# Patient Record
Sex: MALE | Race: WHITE | NOT HISPANIC OR LATINO | Employment: OTHER | URBAN - METROPOLITAN AREA
[De-identification: names, ages, dates, MRNs, and addresses within clinical notes are randomized per-mention and may not be internally consistent; named-entity substitution may affect disease eponyms.]

---

## 2020-09-10 ENCOUNTER — HOSPITAL ENCOUNTER (OUTPATIENT)
Dept: CARDIOLOGY | Facility: HOSPITAL | Age: 70
Discharge: HOME | End: 2020-09-10
Attending: INTERNAL MEDICINE

## 2020-10-06 ENCOUNTER — HOSPITAL ENCOUNTER (OUTPATIENT)
Dept: OTHER | Facility: HOSPITAL | Age: 70
Discharge: HOME | End: 2020-10-06
Attending: INTERNAL MEDICINE

## 2022-02-18 ENCOUNTER — OFFICE VISIT (OUTPATIENT)
Dept: FAMILY MEDICINE CLINIC | Age: 72
End: 2022-02-18

## 2022-02-18 ENCOUNTER — LAB (OUTPATIENT)
Dept: LAB | Facility: HOSPITAL | Age: 72
End: 2022-02-18

## 2022-02-18 VITALS
BODY MASS INDEX: 27.58 KG/M2 | HEIGHT: 68 IN | OXYGEN SATURATION: 93 % | WEIGHT: 182 LBS | SYSTOLIC BLOOD PRESSURE: 133 MMHG | DIASTOLIC BLOOD PRESSURE: 83 MMHG | TEMPERATURE: 98.1 F | HEART RATE: 72 BPM

## 2022-02-18 DIAGNOSIS — Z00.00 WELLNESS EXAMINATION: ICD-10-CM

## 2022-02-18 DIAGNOSIS — F41.9 ANXIETY: ICD-10-CM

## 2022-02-18 DIAGNOSIS — E03.9 HYPOTHYROIDISM, UNSPECIFIED TYPE: ICD-10-CM

## 2022-02-18 DIAGNOSIS — Z00.00 WELLNESS EXAMINATION: Primary | ICD-10-CM

## 2022-02-18 DIAGNOSIS — Z11.59 SCREENING FOR VIRAL DISEASE: ICD-10-CM

## 2022-02-18 DIAGNOSIS — E78.01 FAMILIAL HYPERCHOLESTEROLEMIA: ICD-10-CM

## 2022-02-18 DIAGNOSIS — H65.93 FLUID LEVEL BEHIND TYMPANIC MEMBRANE OF BOTH EARS: ICD-10-CM

## 2022-02-18 DIAGNOSIS — I10 PRIMARY HYPERTENSION: ICD-10-CM

## 2022-02-18 DIAGNOSIS — R07.9 CHEST PAIN, UNSPECIFIED TYPE: ICD-10-CM

## 2022-02-18 LAB
ALBUMIN SERPL-MCNC: 4.9 G/DL (ref 3.5–5.2)
ALBUMIN/GLOB SERPL: 1.6 G/DL
ALP SERPL-CCNC: 71 U/L (ref 39–117)
ALT SERPL W P-5'-P-CCNC: 23 U/L (ref 1–41)
ANION GAP SERPL CALCULATED.3IONS-SCNC: 9 MMOL/L (ref 5–15)
AST SERPL-CCNC: 27 U/L (ref 1–40)
BILIRUB SERPL-MCNC: 0.4 MG/DL (ref 0–1.2)
BUN SERPL-MCNC: 13 MG/DL (ref 8–23)
BUN/CREAT SERPL: 15.3 (ref 7–25)
CALCIUM SPEC-SCNC: 9.9 MG/DL (ref 8.6–10.5)
CHLORIDE SERPL-SCNC: 100 MMOL/L (ref 98–107)
CHOLEST SERPL-MCNC: 179 MG/DL (ref 0–200)
CO2 SERPL-SCNC: 29 MMOL/L (ref 22–29)
CREAT SERPL-MCNC: 0.85 MG/DL (ref 0.76–1.27)
DEPRECATED RDW RBC AUTO: 48.1 FL (ref 37–54)
ERYTHROCYTE [DISTWIDTH] IN BLOOD BY AUTOMATED COUNT: 13.5 % (ref 12.3–15.4)
GFR SERPL CREATININE-BSD FRML MDRD: 89 ML/MIN/1.73
GLOBULIN UR ELPH-MCNC: 3 GM/DL
GLUCOSE SERPL-MCNC: 86 MG/DL (ref 65–99)
HCT VFR BLD AUTO: 44.3 % (ref 37.5–51)
HDLC SERPL-MCNC: 43 MG/DL (ref 40–60)
HGB BLD-MCNC: 14.6 G/DL (ref 13–17.7)
LDLC SERPL CALC-MCNC: 110 MG/DL (ref 0–100)
LDLC/HDLC SERPL: 2.49 {RATIO}
MCH RBC QN AUTO: 31.7 PG (ref 26.6–33)
MCHC RBC AUTO-ENTMCNC: 33 G/DL (ref 31.5–35.7)
MCV RBC AUTO: 96.1 FL (ref 79–97)
PLATELET # BLD AUTO: 291 10*3/MM3 (ref 140–450)
PMV BLD AUTO: 10.3 FL (ref 6–12)
POTASSIUM SERPL-SCNC: 4.2 MMOL/L (ref 3.5–5.2)
PROT SERPL-MCNC: 7.9 G/DL (ref 6–8.5)
RBC # BLD AUTO: 4.61 10*6/MM3 (ref 4.14–5.8)
SODIUM SERPL-SCNC: 138 MMOL/L (ref 136–145)
TRIGL SERPL-MCNC: 145 MG/DL (ref 0–150)
TSH SERPL DL<=0.05 MIU/L-ACNC: 1.21 UIU/ML (ref 0.27–4.2)
VLDLC SERPL-MCNC: 26 MG/DL (ref 5–40)
WBC NRBC COR # BLD: 6.09 10*3/MM3 (ref 3.4–10.8)

## 2022-02-18 PROCEDURE — 80053 COMPREHEN METABOLIC PANEL: CPT

## 2022-02-18 PROCEDURE — G0439 PPPS, SUBSEQ VISIT: HCPCS | Performed by: NURSE PRACTITIONER

## 2022-02-18 PROCEDURE — 86803 HEPATITIS C AB TEST: CPT

## 2022-02-18 PROCEDURE — 84443 ASSAY THYROID STIM HORMONE: CPT

## 2022-02-18 PROCEDURE — 99213 OFFICE O/P EST LOW 20 MIN: CPT | Performed by: NURSE PRACTITIONER

## 2022-02-18 PROCEDURE — 36415 COLL VENOUS BLD VENIPUNCTURE: CPT

## 2022-02-18 PROCEDURE — 80061 LIPID PANEL: CPT

## 2022-02-18 PROCEDURE — 1159F MED LIST DOCD IN RCRD: CPT | Performed by: NURSE PRACTITIONER

## 2022-02-18 PROCEDURE — 85027 COMPLETE CBC AUTOMATED: CPT

## 2022-02-18 RX ORDER — DIGOXIN 125 MCG
125 TABLET ORAL DAILY
COMMUNITY
End: 2023-02-09

## 2022-02-18 RX ORDER — TAMSULOSIN HYDROCHLORIDE 0.4 MG/1
1 CAPSULE ORAL DAILY
COMMUNITY
End: 2022-02-18

## 2022-02-18 RX ORDER — FLUTICASONE PROPIONATE 50 MCG
2 SPRAY, SUSPENSION (ML) NASAL DAILY
Qty: 11.1 ML | Refills: 0 | Status: SHIPPED | OUTPATIENT
Start: 2022-02-18

## 2022-02-18 RX ORDER — NITROGLYCERIN 0.4 MG/1
0.4 TABLET SUBLINGUAL
Qty: 100 TABLET | Refills: 0 | Status: SHIPPED | OUTPATIENT
Start: 2022-02-18

## 2022-02-18 RX ORDER — BUSPIRONE HYDROCHLORIDE 10 MG/1
10 TABLET ORAL 2 TIMES DAILY
Qty: 60 TABLET | Refills: 1 | Status: SHIPPED | OUTPATIENT
Start: 2022-02-18 | End: 2023-02-09

## 2022-02-18 RX ORDER — LISINOPRIL AND HYDROCHLOROTHIAZIDE 20; 12.5 MG/1; MG/1
1 TABLET ORAL DAILY
COMMUNITY
Start: 2021-12-27 | End: 2022-02-18

## 2022-02-18 RX ORDER — METOPROLOL TARTRATE 50 MG/1
50 TABLET, FILM COATED ORAL 2 TIMES DAILY
COMMUNITY
End: 2023-02-09

## 2022-02-18 RX ORDER — AMLODIPINE BESYLATE 5 MG/1
5 TABLET ORAL DAILY
COMMUNITY
End: 2023-02-09

## 2022-02-18 RX ORDER — LISINOPRIL AND HYDROCHLOROTHIAZIDE 20; 12.5 MG/1; MG/1
1 TABLET ORAL DAILY
COMMUNITY
End: 2023-03-24 | Stop reason: SDUPTHER

## 2022-02-18 RX ORDER — NITROGLYCERIN 80 MG/1
1 PATCH TRANSDERMAL DAILY
COMMUNITY
End: 2022-02-18

## 2022-02-18 RX ORDER — NITROGLYCERIN 0.4 MG/1
0.4 TABLET SUBLINGUAL
COMMUNITY
End: 2022-02-18

## 2022-02-18 RX ORDER — PRAVASTATIN SODIUM 20 MG
20 TABLET ORAL DAILY
COMMUNITY
End: 2022-02-21 | Stop reason: SDUPTHER

## 2022-02-18 RX ORDER — ALLOPURINOL 100 MG/1
100 TABLET ORAL DAILY
COMMUNITY
End: 2022-02-18 | Stop reason: SDUPTHER

## 2022-02-18 RX ORDER — AZELASTINE HYDROCHLORIDE, FLUTICASONE PROPIONATE 137; 50 UG/1; UG/1
SPRAY, METERED NASAL
COMMUNITY
End: 2022-02-18

## 2022-02-18 RX ORDER — ALLOPURINOL 100 MG/1
100 TABLET ORAL DAILY
COMMUNITY
Start: 2021-12-04 | End: 2022-02-18

## 2022-02-18 RX ORDER — LEVOTHYROXINE SODIUM 112 UG/1
112 TABLET ORAL DAILY
COMMUNITY
End: 2023-02-09

## 2022-02-18 NOTE — PROGRESS NOTES
Subsequent Medicare Wellness Visit  The ABC's of Medicare Wellness Visit  Chief Complaint   Patient presents with   • Establish Care     new pt       Subjective   History of Present Illness      Telly Meneses is a 71 y.o. male who presents   for a Subsequent Medicare Wellness Visit.    Does the patient have evidence of cognitive impairment?   No    Asprin use counseling:Contraindicated from taking ASA    Recent Hospitalizations:  No hospitalization(s) within the last year.    Advanced Care Planning:  ACP discussion was held with the patient during this visit. Patient does not have an advance directive, information provided.    The following portions of the patient's history were reviewed   and updated as appropriate: allergies, current medications, past family history, past medical history, past social history, past surgical history and problem list.    Compared to one year ago, the patient feels his   physical health is the same.  Compared to one year ago, the patient feels his   mental health is worse.    Reviewed chart for potential of high risk medication in the elderly:  no  Reviewed chart for potential of harmful drug interactions in the elderly:  no    Patient's Body mass index is 27.67 kg/m². indicating that he is overweight (BMI 25-29.9). Patient's (Body mass index is 27.67 kg/m².) indicates that they are overweight with health conditions that include obstructive sleep apnea, hypertension, coronary heart disease, dyslipidemias and osteoarthritis . Weight is unchanged. BMI is is above average; BMI management plan is completed. We discussed portion control and increasing exercise. .       HEALTH RISK ASSESSMENT BEGIN  Fall Risk Screen:  BREANNA Fall Risk Assessment was completed, and patient is at HIGH risk for falls. Assessment completed on:2/18/2022    Depression Screen:   PHQ-2/PHQ-9 Depression Screening 2/18/2022   Little interest or pleasure in doing things 0   Feeling down, depressed, or hopeless 0    Total Score 0       Current Medical Providers:  Patient Care Team:  Sayda Jones APRN as PCP - General (Nurse Practitioner)    Smoking Status:  Social History     Tobacco Use   Smoking Status Current Every Day Smoker   • Types: Cigarettes   Smokeless Tobacco Never Used       Alcohol Consumption:  Social History     Substance and Sexual Activity   Alcohol Use Yes    Comment: occ       Health Habits and Functional and Cognitive Screening:  No flowsheet data found.    Age-appropriate Screening Schedule:  Refer to the list below for future screening recommendations based on patient's age, sex and/or medical conditions. Orders for these recommended tests are listed in the plan section. The patient has been provided with a written plan.    Health Maintenance   Topic Date Due   • ZOSTER VACCINE (1 of 2) Never done   • LIPID PANEL  02/18/2023   • TDAP/TD VACCINES (2 - Td or Tdap) 02/25/2026   • INFLUENZA VACCINE  Completed     HEALTH RISK ASSESSMENT END    Outpatient Medications Prior to Visit   Medication Sig Dispense Refill   • lisinopril-hydrochlorothiazide (PRINZIDE,ZESTORETIC) 20-12.5 MG per tablet Take 1 tablet by mouth Daily.     • allopurinol (ZYLOPRIM) 100 MG tablet Take 100 mg by mouth Daily.     • nitroglycerin (NITRODUR) 0.4 MG/HR patch Place 1 patch on the skin as directed by provider Daily.     • nitroglycerin (NITROSTAT) 0.4 MG SL tablet Place 0.4 mg under the tongue Every 5 (Five) Minutes As Needed for Chest Pain. Take no more than 3 doses in 15 minutes.     • pravastatin (PRAVACHOL) 20 MG tablet Take 20 mg by mouth Daily.     • amLODIPine (NORVASC) 5 MG tablet Take 5 mg by mouth Daily.     • digoxin (LANOXIN) 125 MCG tablet Take 125 mcg by mouth Daily.     • levothyroxine (SYNTHROID, LEVOTHROID) 112 MCG tablet Take 112 mcg by mouth Daily.     • metoprolol tartrate (LOPRESSOR) 50 MG tablet Take 50 mg by mouth 2 (Two) Times a Day.     • allopurinol (ZYLOPRIM) 100 MG tablet Take 100 mg by mouth Daily.    "  • apixaban (ELIQUIS) 5 MG tablet tablet Take 5 mg by mouth 2 (Two) Times a Day.     • Azelastine-Fluticasone (Dymista) 137-50 MCG/ACT suspension into the nostril(s) as directed by provider.     • lisinopril-hydrochlorothiazide (PRINZIDE,ZESTORETIC) 20-12.5 MG per tablet Take 1 tablet by mouth Daily.     • tamsulosin (FLOMAX) 0.4 MG capsule 24 hr capsule Take 1 capsule by mouth Daily.       No facility-administered medications prior to visit.       Patient Active Problem List   Diagnosis   • Hypertension   • Hypothyroidism   • Anxiety   • Hyperlipidemia   • Tinnitus       Review of Systems   Constitutional: Positive for fatigue.   HENT: Negative for congestion.    Eyes: Positive for visual disturbance.   Respiratory: Positive for cough and shortness of breath.    Cardiovascular: Positive for chest pain. Negative for leg swelling.   Gastrointestinal: Negative.  Negative for abdominal pain, constipation and diarrhea.   Endocrine: Negative.    Genitourinary: Negative.  Negative for difficulty urinating.   Musculoskeletal: Positive for back pain and neck pain.   Skin: Negative.    Allergic/Immunologic: Negative.    Neurological: Positive for dizziness.   Hematological: Bruises/bleeds easily.   Psychiatric/Behavioral: Positive for sleep disturbance.       Objective      Vitals:    02/18/22 1011 02/18/22 1022   BP:  133/83   BP Location:  Left arm   Patient Position:  Sitting   Cuff Size:  Adult   Pulse:  72   Temp: 98.1 °F (36.7 °C) 98.1 °F (36.7 °C)   TempSrc: Oral Oral   SpO2:  93%   Weight:  82.6 kg (182 lb)   Height:  172.7 cm (68\")       Physical Exam  HENT:      Head: Normocephalic.      Right Ear: Decreased hearing noted. A middle ear effusion is present.      Left Ear: Decreased hearing noted. A middle ear effusion is present.      Nose: Nose normal.      Mouth/Throat:      Mouth: Mucous membranes are moist.      Pharynx: Oropharynx is clear.   Cardiovascular:      Rate and Rhythm: Normal rate.      Heart " sounds: Murmur heard.       Pulmonary:      Effort: Pulmonary effort is normal. No respiratory distress.      Breath sounds: Normal breath sounds. No stridor. No wheezing, rhonchi or rales.   Chest:      Chest wall: No tenderness.   Abdominal:      General: Bowel sounds are normal.   Musculoskeletal:         General: Normal range of motion.   Skin:     General: Skin is warm and dry.      Capillary Refill: Capillary refill takes less than 2 seconds.   Neurological:      Mental Status: He is alert and oriented to person, place, and time.   Psychiatric:         Mood and Affect: Mood normal.             Result Review :     Lab Results   Component Value Date    TRIG 145 02/18/2022    HDL 43 02/18/2022     (H) 02/18/2022    VLDL 26 02/18/2022                Assessment/Plan   Assessment and Plan      Diagnoses and all orders for this visit:    1. Wellness examination (Primary)  -     CBC (No diff); Future  -     Comprehensive metabolic panel; Future  -     Lipid panel; Future  -     TSH Rfx On Abnormal To Free T4; Future    2. Primary hypertension  Assessment & Plan:  Hypertension is improving with treatment.  Continue current treatment regimen.  Dietary sodium restriction.  Regular aerobic exercise.  Stop smoking.  Continue current medications.  Ambulatory blood pressure monitoring.  Blood pressure will be reassessed in 3 months.      3. Chest pain, unspecified type  -     nitroglycerin (NITROSTAT) 0.4 MG SL tablet; Place 1 tablet under the tongue Every 5 (Five) Minutes As Needed for Chest Pain. Take no more than 3 doses in 15 minutes.  Dispense: 100 tablet; Refill: 0    4. Anxiety  Assessment & Plan:  Worsening. Life situations. Buspar 10 mg twice daily. Follow up in 3 months.    Orders:  -     busPIRone (BUSPAR) 10 MG tablet; Take 1 tablet by mouth 2 (Two) Times a Day.  Dispense: 60 tablet; Refill: 1    5. Fluid level behind tympanic membrane of both ears  -     fluticasone (Flonase) 50 MCG/ACT nasal spray; 2  sprays into the nostril(s) as directed by provider Daily.  Dispense: 11.1 mL; Refill: 0    6. Familial hypercholesterolemia  Assessment & Plan:  Lipid abnormalities are improving with treatment.  Pharmacotherapy as ordered.  Lipids will be reassessed in 6 months.    Orders:  -     pravastatin (PRAVACHOL) 10 MG tablet; Take 2 tablets by mouth Daily.  Dispense: 90 tablet; Refill: 0    7. Hypothyroidism, unspecified type  Assessment & Plan:  Controlled. Follow up in 6 months.      8. Screening for viral disease  -     Hepatitis C antibody; Future      Medicare Risks and Personalized Health Plan  CMS Preventative Services Quick Reference  Advance Directive Discussion  Cardiovascular risk  Depression/Dysphoria  Diabetic Lab Screening   Immunizations Discussed/Encouraged (specific immunizations; Shingrix )  Lung Cancer Risk  Obesity/Overweight     The above risks/problems have been discussed with the patient.  Pertinent information has been shared with the patient in the   After Visit Summary. Follow up plans and orders are seen below   in the Assessment/Plan Section.        Follow Up    No follow-ups on file.     An After Visit Summary and PPPS were given to the patient.

## 2022-02-19 LAB — HCV AB SER DONR QL: NORMAL

## 2022-02-21 PROBLEM — F41.9 ANXIETY: Status: ACTIVE | Noted: 2022-02-21

## 2022-02-21 RX ORDER — PRAVASTATIN SODIUM 10 MG
20 TABLET ORAL DAILY
Qty: 90 TABLET | Refills: 0 | Status: SHIPPED | OUTPATIENT
Start: 2022-02-21 | End: 2023-02-09

## 2022-02-21 NOTE — ASSESSMENT & PLAN NOTE
Hypertension is improving with treatment.  Continue current treatment regimen.  Dietary sodium restriction.  Regular aerobic exercise.  Stop smoking.  Continue current medications.  Ambulatory blood pressure monitoring.  Blood pressure will be reassessed in 3 months.

## 2022-02-23 ENCOUNTER — TELEPHONE (OUTPATIENT)
Dept: FAMILY MEDICINE CLINIC | Age: 72
End: 2022-02-23

## 2022-02-23 DIAGNOSIS — G47.00 INSOMNIA, UNSPECIFIED TYPE: Primary | ICD-10-CM

## 2022-02-23 RX ORDER — TRAZODONE HYDROCHLORIDE 50 MG/1
50 TABLET ORAL NIGHTLY
Qty: 90 TABLET | Refills: 1 | Status: SHIPPED | OUTPATIENT
Start: 2022-02-23 | End: 2023-02-09

## 2022-02-23 NOTE — TELEPHONE ENCOUNTER
Caller: Telly Meneses    Relationship: Self    Best call back number: [phone]    What medication are you requesting:SLEEP MEDICATION    What are your current symptoms: INSOMNIA    How long have you been experiencing symptoms:YES    Have you had these symptoms before:    [x] Yes  [] No    Have you been treated for these symptoms before:   [x] Yes  [] No    If a prescription is needed, what is your preferred pharmacy and phone number:      [facility] - [place], [place] - [address]  - [phone] [facility] [phone]   [phone]    Additional notes:PATIENT FORGOT TO MENTION THIS MEDICATION WHEN HE WAS IN ON HIS LAST APPOINTMENT. PLEASE CALL SCRIPT INTO PHARMACY ASAP.

## 2022-03-08 ENCOUNTER — TELEPHONE (OUTPATIENT)
Dept: FAMILY MEDICINE CLINIC | Age: 72
End: 2022-03-08

## 2022-03-08 DIAGNOSIS — R06.02 SHORTNESS OF BREATH: Primary | ICD-10-CM

## 2022-03-08 RX ORDER — ALBUTEROL SULFATE 90 UG/1
2 AEROSOL, METERED RESPIRATORY (INHALATION) EVERY 4 HOURS PRN
Qty: 18 G | Refills: 0 | Status: SHIPPED | OUTPATIENT
Start: 2022-03-08

## 2022-03-08 NOTE — TELEPHONE ENCOUNTER
Pt called asking for an inhaler for his COPD.  He states he hasnt had one in 10-15 years, but he is starting to cough more and have more soa.  Do you need to see him? Or schedule him with pulmonology?

## 2022-05-31 ENCOUNTER — HOSPITAL ENCOUNTER (OUTPATIENT)
Dept: GENERAL RADIOLOGY | Facility: HOSPITAL | Age: 72
Discharge: HOME | End: 2022-05-31
Admitting: NURSE PRACTITIONER

## 2022-05-31 ENCOUNTER — OFFICE VISIT (OUTPATIENT)
Dept: FAMILY MEDICINE CLINIC | Age: 72
End: 2022-05-31

## 2022-05-31 VITALS
HEIGHT: 68 IN | BODY MASS INDEX: 28.49 KG/M2 | OXYGEN SATURATION: 99 % | SYSTOLIC BLOOD PRESSURE: 138 MMHG | HEART RATE: 67 BPM | DIASTOLIC BLOOD PRESSURE: 67 MMHG | WEIGHT: 188 LBS | TEMPERATURE: 98.4 F

## 2022-05-31 DIAGNOSIS — M54.9 MID BACK PAIN: Primary | ICD-10-CM

## 2022-05-31 DIAGNOSIS — F17.210 SMOKING GREATER THAN 40 PACK YEARS: ICD-10-CM

## 2022-05-31 PROCEDURE — 72070 X-RAY EXAM THORAC SPINE 2VWS: CPT

## 2022-05-31 PROCEDURE — 99213 OFFICE O/P EST LOW 20 MIN: CPT | Performed by: NURSE PRACTITIONER

## 2022-05-31 RX ORDER — ALLOPURINOL 100 MG/1
100 TABLET ORAL DAILY
COMMUNITY
Start: 2022-03-05 | End: 2023-03-24 | Stop reason: SDUPTHER

## 2022-05-31 RX ORDER — BACLOFEN 10 MG/1
10 TABLET ORAL 3 TIMES DAILY
Qty: 30 TABLET | Refills: 1 | Status: SHIPPED | OUTPATIENT
Start: 2022-05-31 | End: 2023-02-09

## 2022-05-31 NOTE — PROGRESS NOTES
"Chief Complaint  Rib Pain    Subjective    Patient is a 72-year-old male in today with complaints of mid back pain.  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  Patient denies injury.  Patient also reports being worried about his lungs, denies any current shortness of breath.  Patient is taking Tylenol arthritis, which provides moderate relief.        Telly Meneses presents to [facility]  Back Pain  This is a recurrent problem. The current episode started 1 to 4 weeks ago. The problem occurs intermittently. The pain is present in the thoracic spine. The quality of the pain is described as aching. The pain does not radiate. The patient is experiencing no pain. The symptoms are aggravated by bending and position. Pertinent negatives include no bladder incontinence, bowel incontinence, chest pain or fever. He has tried NSAIDs for the symptoms. The treatment provided moderate relief.       Objective   Vital Signs:  /67 (BP Location: Left arm, Patient Position: Sitting)   Pulse 67   Temp 98.4 °F (36.9 °C) (Oral)   Ht 172.7 cm (68\")   Wt 85.3 kg (188 lb)   SpO2 99%   BMI 28.59 kg/m²           Physical Exam  HENT:      Head: Normocephalic.   Cardiovascular:      Rate and Rhythm: Normal rate and regular rhythm.   Pulmonary:      Effort: Pulmonary effort is normal. No respiratory distress.      Breath sounds: Normal breath sounds. No stridor. No wheezing, rhonchi or rales.   Musculoskeletal:      Thoracic back: Spasms and tenderness present.        Back:    Skin:     General: Skin is warm and dry.   Neurological:      Mental Status: He is alert and oriented to person, place, and time.   Psychiatric:         Mood and Affect: Mood normal.        Result Review :                Assessment and Plan   Diagnoses and all orders for this visit:    1. Mid back pain (Primary)  -     XR Spine Thoracic 2 " View (In Office)  -      CT Chest Low Dose Cancer Screening WO; Future  -     baclofen (LIORESAL) 10 MG tablet; Take 1 tablet by mouth 3 (Three) Times a Day.  Dispense: 30 tablet; Refill: 1  -     diclofenac (VOLTAREN) 50 MG EC tablet; Take 1 tablet by mouth 2 (Two) Times a Day As Needed (Mid back pain) for up to 14 days.  Dispense: 28 tablet; Refill: 0    2. Smoking greater than 40 pack years  -      CT Chest Low Dose Cancer Screening WO; Future             Follow Up   Return if symptoms worsen or fail to improve.  Patient was given instructions and counseling regarding his condition or for health maintenance advice. Please see specific information pulled into the AVS if appropriate.

## 2022-06-30 ENCOUNTER — APPOINTMENT (OUTPATIENT)
Dept: CT IMAGING | Facility: HOSPITAL | Age: 72
End: 2022-06-30

## 2022-07-01 ENCOUNTER — HOSPITAL ENCOUNTER (OUTPATIENT)
Dept: CT IMAGING | Facility: HOSPITAL | Age: 72
Discharge: HOME | End: 2022-07-01
Admitting: NURSE PRACTITIONER

## 2022-07-01 DIAGNOSIS — F17.210 SMOKING GREATER THAN 40 PACK YEARS: ICD-10-CM

## 2022-07-01 DIAGNOSIS — M54.9 MID BACK PAIN: ICD-10-CM

## 2022-07-01 PROCEDURE — 71271 CT THORAX LUNG CANCER SCR C-: CPT

## 2022-07-01 NOTE — PROGRESS NOTES
Low dose CT chest shows mild emphysema, no concern for lung cancer at this time. Repeat in one year per recommendations.

## 2023-02-09 ENCOUNTER — LAB (OUTPATIENT)
Dept: LAB | Facility: HOSPITAL | Age: 73
End: 2023-02-09
Payer: MEDICARE

## 2023-02-09 ENCOUNTER — OFFICE VISIT (OUTPATIENT)
Dept: FAMILY MEDICINE CLINIC | Age: 73
End: 2023-02-09
Payer: MEDICARE

## 2023-02-09 VITALS
HEART RATE: 70 BPM | BODY MASS INDEX: 28.19 KG/M2 | WEIGHT: 186 LBS | OXYGEN SATURATION: 97 % | DIASTOLIC BLOOD PRESSURE: 86 MMHG | SYSTOLIC BLOOD PRESSURE: 130 MMHG | HEIGHT: 68 IN

## 2023-02-09 DIAGNOSIS — E78.01 FAMILIAL HYPERCHOLESTEROLEMIA: ICD-10-CM

## 2023-02-09 DIAGNOSIS — I51.9 HEART DISEASE: ICD-10-CM

## 2023-02-09 DIAGNOSIS — F41.9 ANXIETY: Primary | ICD-10-CM

## 2023-02-09 DIAGNOSIS — Z85.46 HISTORY OF PROSTATE CANCER: ICD-10-CM

## 2023-02-09 DIAGNOSIS — G47.00 INSOMNIA, UNSPECIFIED TYPE: ICD-10-CM

## 2023-02-09 DIAGNOSIS — E03.9 HYPOTHYROIDISM, UNSPECIFIED TYPE: ICD-10-CM

## 2023-02-09 DIAGNOSIS — I10 PRIMARY HYPERTENSION: ICD-10-CM

## 2023-02-09 LAB
ALBUMIN SERPL-MCNC: 4.7 G/DL (ref 3.5–5.2)
ALBUMIN/GLOB SERPL: 1.6 G/DL
ALP SERPL-CCNC: 66 U/L (ref 39–117)
ALT SERPL W P-5'-P-CCNC: 22 U/L (ref 1–41)
ANION GAP SERPL CALCULATED.3IONS-SCNC: 7.7 MMOL/L (ref 5–15)
AST SERPL-CCNC: 26 U/L (ref 1–40)
BILIRUB SERPL-MCNC: 0.3 MG/DL (ref 0–1.2)
BUN SERPL-MCNC: 16 MG/DL (ref 8–23)
BUN/CREAT SERPL: 16 (ref 7–25)
CALCIUM SPEC-SCNC: 9.6 MG/DL (ref 8.6–10.5)
CHLORIDE SERPL-SCNC: 100 MMOL/L (ref 98–107)
CHOLEST SERPL-MCNC: 177 MG/DL (ref 0–200)
CO2 SERPL-SCNC: 30.3 MMOL/L (ref 22–29)
CREAT SERPL-MCNC: 1 MG/DL (ref 0.76–1.27)
EGFRCR SERPLBLD CKD-EPI 2021: 80 ML/MIN/1.73
GLOBULIN UR ELPH-MCNC: 2.9 GM/DL
GLUCOSE SERPL-MCNC: 98 MG/DL (ref 65–99)
HDLC SERPL-MCNC: 55 MG/DL (ref 40–60)
LDLC SERPL CALC-MCNC: 105 MG/DL (ref 0–100)
LDLC/HDLC SERPL: 1.89 {RATIO}
POTASSIUM SERPL-SCNC: 4.5 MMOL/L (ref 3.5–5.2)
PROT SERPL-MCNC: 7.6 G/DL (ref 6–8.5)
SODIUM SERPL-SCNC: 138 MMOL/L (ref 136–145)
TRIGL SERPL-MCNC: 91 MG/DL (ref 0–150)
TSH SERPL DL<=0.05 MIU/L-ACNC: 1.01 UIU/ML (ref 0.27–4.2)
VLDLC SERPL-MCNC: 17 MG/DL (ref 5–40)

## 2023-02-09 PROCEDURE — 80061 LIPID PANEL: CPT

## 2023-02-09 PROCEDURE — 99214 OFFICE O/P EST MOD 30 MIN: CPT | Performed by: NURSE PRACTITIONER

## 2023-02-09 PROCEDURE — 84443 ASSAY THYROID STIM HORMONE: CPT

## 2023-02-09 PROCEDURE — 80053 COMPREHEN METABOLIC PANEL: CPT

## 2023-02-09 PROCEDURE — 36415 COLL VENOUS BLD VENIPUNCTURE: CPT

## 2023-02-09 RX ORDER — PRAVASTATIN SODIUM 20 MG
1 TABLET ORAL DAILY
COMMUNITY
Start: 2022-12-29 | End: 2023-03-24 | Stop reason: SDUPTHER

## 2023-02-09 RX ORDER — HYDROXYZINE HYDROCHLORIDE 25 MG/1
25 TABLET, FILM COATED ORAL 3 TIMES DAILY PRN
Qty: 60 TABLET | Refills: 2 | Status: SHIPPED | OUTPATIENT
Start: 2023-02-09 | End: 2023-03-24 | Stop reason: SDUPTHER

## 2023-02-09 NOTE — PROGRESS NOTES
"Chief Complaint  Establish Care (Last PCP Sayda LOWE), Anxiety, and Depression    Subjective          Telly Meneses presents to [facility]     Patient is a 72-year-old male who is here today to discuss anxiety.  He denies depression.  Also experiencing insomnia.  Drinks coffee or Mountain Dew 24/7.  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  States he starts thinking about his relationship with his sister, who he feels as if treating him poorly especially upon the mother's passing.  He has tried counseling once before but it was not helpful.  He has taken Wellbutrin and Effexor in the past with no benefit.  He more recently has tried trazodone and buspirone without benefit.  He would like to discuss taking medication for anxiety and insomnia.    States he has 4 cardiac stents and has not seen a cardiologist since Dr. Sims retired previously.  He denies any chest pain or irregular heartbeat.     Regarding hypothyroidism, he is not taking his levothyroxine 112 mcg daily in about 1 year.  Denies medication side effects.  Objective   Vital Signs:   Vitals:    02/09/23 1002 02/09/23 1049   BP: 144/89 130/86   BP Location: Left arm Right arm   Patient Position: Sitting Sitting   Cuff Size: Large Adult Adult   Pulse: 70    SpO2: 97%    Weight: 84.4 kg (186 lb)    Height: 172.7 cm (68\")       Body mass index is 28.28 kg/m².  Physical Exam  Vitals reviewed.   Constitutional:       General: He is not in acute distress.     Appearance: Normal appearance. He is well-developed.   Neck:      Thyroid: No thyroid mass, thyromegaly or thyroid tenderness.   Cardiovascular:      Rate and Rhythm: Normal rate and regular rhythm.      Heart sounds: Normal heart sounds.   Pulmonary:      Effort: Pulmonary effort is normal.      Breath sounds: Normal breath sounds.   Musculoskeletal:      Right lower leg: No edema.      Left lower leg: No edema.   Skin:     General: " Skin is warm and dry.   Neurological:      General: No focal deficit present.      Mental Status: He is alert.   Psychiatric:         Attention and Perception: Attention normal.         Mood and Affect: Mood and affect normal.         Behavior: Behavior normal.           Current Outpatient Medications:   •  albuterol sulfate  (90 Base) MCG/ACT inhaler, Inhale 2 puffs Every 4 (Four) Hours As Needed for Wheezing., Disp: 18 g, Rfl: 0  •  allopurinol (ZYLOPRIM) 100 MG tablet, Take 100 mg by mouth Daily., Disp: , Rfl:   •  fluticasone (Flonase) 50 MCG/ACT nasal spray, 2 sprays into the nostril(s) as directed by provider Daily., Disp: 11.1 mL, Rfl: 0  •  lisinopril-hydrochlorothiazide (PRINZIDE,ZESTORETIC) 20-12.5 MG per tablet, Take 1 tablet by mouth Daily., Disp: , Rfl:   •  pravastatin (PRAVACHOL) 20 MG tablet, Take 1 tablet by mouth Daily., Disp: , Rfl:   •  hydrOXYzine (ATARAX) 25 MG tablet, Take 1 tablet by mouth 3 (Three) Times a Day As Needed for Anxiety., Disp: 60 tablet, Rfl: 2  •  nitroglycerin (NITROSTAT) 0.4 MG SL tablet, Place 1 tablet under the tongue Every 5 (Five) Minutes As Needed for Chest Pain. Take no more than 3 doses in 15 minutes., Disp: 100 tablet, Rfl: 0  •  sertraline (Zoloft) 50 MG tablet, Take 1/2 tablet at bedtime x 1 week then increase to 1 tablet daily, Disp: 30 tablet, Rfl: 3       Result Review :     CMP    CMP 2/18/22   Glucose 86   BUN 13   Creatinine 0.85   eGFR Non African Am 89   Sodium 138   Potassium 4.2   Chloride 100   Calcium 9.9   Total Protein 7.9   Albumin 4.90   Globulin 3.0   Total Bilirubin 0.4   Alkaline Phosphatase 71   AST (SGOT) 27   ALT (SGPT) 23   Albumin/Globulin Ratio 1.6   BUN/Creatinine Ratio 15.3   Anion Gap 9.0           CBC    CBC 2/18/22   WBC 6.09   RBC 4.61   Hemoglobin 14.6   Hematocrit 44.3   MCV 96.1   MCH 31.7   MCHC 33.0   RDW 13.5   Platelets 291           CBC w/diff    CBC w/Diff 2/18/22   WBC 6.09   RBC 4.61   Hemoglobin 14.6   Hematocrit  44.3   MCV 96.1   MCH 31.7   MCHC 33.0   RDW 13.5   Platelets 291           Lipid Panel    Lipid Panel 2/18/22   Total Cholesterol 179   Triglycerides 145   HDL Cholesterol 43   VLDL Cholesterol 26   LDL Cholesterol  110 (A)   LDL/HDL Ratio 2.49   (A) Abnormal value            TSH    TSH 2/18/22   TSH 1.210                    Assessment and Plan    Diagnoses and all orders for this visit:    1. Anxiety (Primary)  Assessment & Plan:  Start sertraline 50 mg as directed and hydroxyzine at bedtime if needed for insomnia.  Follow-up in 6 weeks or sooner if concerns.  Further treatment pending other lab results today.    Orders:  -     sertraline (Zoloft) 50 MG tablet; Take 1/2 tablet at bedtime x 1 week then increase to 1 tablet daily  Dispense: 30 tablet; Refill: 3  -     hydrOXYzine (ATARAX) 25 MG tablet; Take 1 tablet by mouth 3 (Three) Times a Day As Needed for Anxiety.  Dispense: 60 tablet; Refill: 2    2. Familial hypercholesterolemia  -     Lipid panel; Future  -     Ambulatory Referral to Cardiology    3. Primary hypertension  -     Comprehensive metabolic panel; Future  -     Lipid panel; Future  -     Ambulatory Referral to Cardiology    4. Hypothyroidism, unspecified type  -     TSH Rfx On Abnormal To Free T4; Future    5. History of prostate cancer    6. Heart disease  -     Ambulatory Referral to Cardiology    7. Insomnia, unspecified type  -     hydrOXYzine (ATARAX) 25 MG tablet; Take 1 tablet by mouth 3 (Three) Times a Day As Needed for Anxiety.  Dispense: 60 tablet; Refill: 2      Follow Up    No follow-ups on file.  Patient was given instructions and counseling regarding his condition or for health maintenance advice. Please see specific information pulled into the AVS if appropriate.

## 2023-02-09 NOTE — ASSESSMENT & PLAN NOTE
Start sertraline 50 mg as directed and hydroxyzine at bedtime if needed for insomnia.  Follow-up in 6 weeks or sooner if concerns.  Further treatment pending other lab results today.

## 2023-02-10 NOTE — PROGRESS NOTES
Blood sugar, kidney, liver, and thyroid function are normal.  Lipid panel is essentially normal.  Labs look very good.

## 2023-03-24 ENCOUNTER — OFFICE VISIT (OUTPATIENT)
Dept: FAMILY MEDICINE CLINIC | Age: 73
End: 2023-03-24
Payer: MEDICARE

## 2023-03-24 VITALS
DIASTOLIC BLOOD PRESSURE: 70 MMHG | HEIGHT: 68 IN | HEART RATE: 68 BPM | BODY MASS INDEX: 27.74 KG/M2 | SYSTOLIC BLOOD PRESSURE: 114 MMHG | OXYGEN SATURATION: 97 % | WEIGHT: 183 LBS

## 2023-03-24 DIAGNOSIS — G47.00 INSOMNIA, UNSPECIFIED TYPE: ICD-10-CM

## 2023-03-24 DIAGNOSIS — F41.9 ANXIETY: ICD-10-CM

## 2023-03-24 DIAGNOSIS — M1A.9XX0 CHRONIC GOUT WITHOUT TOPHUS, UNSPECIFIED CAUSE, UNSPECIFIED SITE: ICD-10-CM

## 2023-03-24 DIAGNOSIS — I10 PRIMARY HYPERTENSION: Primary | ICD-10-CM

## 2023-03-24 DIAGNOSIS — E78.01 FAMILIAL HYPERCHOLESTEROLEMIA: ICD-10-CM

## 2023-03-24 PROCEDURE — 99214 OFFICE O/P EST MOD 30 MIN: CPT | Performed by: NURSE PRACTITIONER

## 2023-03-24 PROCEDURE — 3078F DIAST BP <80 MM HG: CPT | Performed by: NURSE PRACTITIONER

## 2023-03-24 PROCEDURE — 3074F SYST BP LT 130 MM HG: CPT | Performed by: NURSE PRACTITIONER

## 2023-03-24 PROCEDURE — 1160F RVW MEDS BY RX/DR IN RCRD: CPT | Performed by: NURSE PRACTITIONER

## 2023-03-24 PROCEDURE — 1159F MED LIST DOCD IN RCRD: CPT | Performed by: NURSE PRACTITIONER

## 2023-03-24 RX ORDER — ALLOPURINOL 100 MG/1
100 TABLET ORAL DAILY
Qty: 90 TABLET | Refills: 1 | Status: SHIPPED | OUTPATIENT
Start: 2023-03-24

## 2023-03-24 RX ORDER — HYDROXYZINE HYDROCHLORIDE 25 MG/1
25 TABLET, FILM COATED ORAL 3 TIMES DAILY PRN
Qty: 90 TABLET | Refills: 1 | Status: SHIPPED | OUTPATIENT
Start: 2023-03-24

## 2023-03-24 RX ORDER — LISINOPRIL AND HYDROCHLOROTHIAZIDE 20; 12.5 MG/1; MG/1
1 TABLET ORAL DAILY
Qty: 90 TABLET | Refills: 1 | Status: SHIPPED | OUTPATIENT
Start: 2023-03-24

## 2023-03-24 RX ORDER — PRAVASTATIN SODIUM 20 MG
20 TABLET ORAL DAILY
Qty: 90 TABLET | Refills: 1 | Status: SHIPPED | OUTPATIENT
Start: 2023-03-24

## 2023-03-24 NOTE — PROGRESS NOTES
"Chief Complaint  Anxiety (6 week follow up )    Subjective          Telly Meneses presents to [facility]     Patient is a 72-year-old male who is here today to follow-up regarding anxiety.  He reports improvement of symptoms on Zoloft 50 mg daily.  He also takes hydroxyzine at bedtime as needed for insomnia and anxiety.  He denies medication side effects and requests refills.    Former patient of Dr. Ribeiro.  History of cardiac valve replacement and has not seen cardiology in many years.  He currently is scheduled to see cardiology June 14.  Advised the importance of seeing cardiology due to his history.  Hypertension is stable with lisinopril 20 mg with hydrochlorothiazide 12.5 mg daily.  Denies side effects and requests refills.    Has not had a flare of gout since starting allopurinol 100 mg daily.  Denies side effects and requests refills.    Hyperlipidemia takes pravastatin 20 mg at bedtime.  Denies side effects and requests refills.     Objective   Vital Signs:   Vitals:    03/24/23 1029   BP: 114/70   BP Location: Left arm   Patient Position: Sitting   Cuff Size: Large Adult   Pulse: 68   SpO2: 97%   Weight: 83 kg (183 lb)   Height: 172.7 cm (68\")      Body mass index is 27.83 kg/m².  Physical Exam  Vitals reviewed.   Constitutional:       General: He is not in acute distress.     Appearance: Normal appearance. He is well-developed.   Cardiovascular:      Rate and Rhythm: Normal rate and regular rhythm.      Heart sounds: Normal heart sounds.   Pulmonary:      Effort: Pulmonary effort is normal.      Breath sounds: Normal breath sounds.   Musculoskeletal:      Right lower leg: No edema.      Left lower leg: No edema.   Skin:     General: Skin is warm and dry.   Neurological:      General: No focal deficit present.      Mental Status: He is alert.   Psychiatric:         Attention and Perception: Attention normal.         Mood and Affect: Mood and affect normal.         " Behavior: Behavior normal.           Current Outpatient Medications:   •  albuterol sulfate  (90 Base) MCG/ACT inhaler, Inhale 2 puffs Every 4 (Four) Hours As Needed for Wheezing., Disp: 18 g, Rfl: 0  •  allopurinol (ZYLOPRIM) 100 MG tablet, Take 1 tablet by mouth Daily., Disp: 90 tablet, Rfl: 1  •  fluticasone (Flonase) 50 MCG/ACT nasal spray, 2 sprays into the nostril(s) as directed by provider Daily., Disp: 11.1 mL, Rfl: 0  •  hydrOXYzine (ATARAX) 25 MG tablet, Take 1 tablet by mouth 3 (Three) Times a Day As Needed for Anxiety., Disp: 90 tablet, Rfl: 1  •  lisinopril-hydrochlorothiazide (PRINZIDE,ZESTORETIC) 20-12.5 MG per tablet, Take 1 tablet by mouth Daily., Disp: 90 tablet, Rfl: 1  •  pravastatin (PRAVACHOL) 20 MG tablet, Take 1 tablet by mouth Daily., Disp: 90 tablet, Rfl: 1  •  sertraline (Zoloft) 50 MG tablet, Take  1 tablet daily, Disp: 90 tablet, Rfl: 1  •  nitroglycerin (NITROSTAT) 0.4 MG SL tablet, Place 1 tablet under the tongue Every 5 (Five) Minutes As Needed for Chest Pain. Take no more than 3 doses in 15 minutes. (Patient not taking: Reported on 3/24/2023), Disp: 100 tablet, Rfl: 0   Past Medical History:   Diagnosis Date   • Anxiety    • Heart murmur    • Hyperlipidemia    • Hypertension    • Hypothyroidism    • Prostate cancer (HCC)          Result Review :     CMP    CMP 2/9/23   Glucose 98   BUN 16   Creatinine 1.00   eGFR 80.0   Sodium 138   Potassium 4.5   Chloride 100   Calcium 9.6   Total Protein 7.6   Albumin 4.7   Globulin 2.9   Total Bilirubin 0.3   Alkaline Phosphatase 66   AST (SGOT) 26   ALT (SGPT) 22   Albumin/Globulin Ratio 1.6   BUN/Creatinine Ratio 16.0   Anion Gap 7.7                 Lipid Panel    Lipid Panel 2/9/23   Total Cholesterol 177   Triglycerides 91   HDL Cholesterol 55   VLDL Cholesterol 17   LDL Cholesterol  105 (A)   LDL/HDL Ratio 1.89   (A) Abnormal value                     Assessment and Plan    Diagnoses and all orders for this visit:    1. Primary  hypertension (Primary)  Assessment & Plan:  Monitor blood pressure at home and report any elevated readings.  Follow-up in 6 months or sooner if concerns.    Orders:  -     lisinopril-hydrochlorothiazide (PRINZIDE,ZESTORETIC) 20-12.5 MG per tablet; Take 1 tablet by mouth Daily.  Dispense: 90 tablet; Refill: 1    2. Anxiety  -     hydrOXYzine (ATARAX) 25 MG tablet; Take 1 tablet by mouth 3 (Three) Times a Day As Needed for Anxiety.  Dispense: 90 tablet; Refill: 1  -     sertraline (Zoloft) 50 MG tablet; Take  1 tablet daily  Dispense: 90 tablet; Refill: 1    3. Insomnia, unspecified type  -     hydrOXYzine (ATARAX) 25 MG tablet; Take 1 tablet by mouth 3 (Three) Times a Day As Needed for Anxiety.  Dispense: 90 tablet; Refill: 1    4. Familial hypercholesterolemia  -     pravastatin (PRAVACHOL) 20 MG tablet; Take 1 tablet by mouth Daily.  Dispense: 90 tablet; Refill: 1    5. Chronic gout without tophus, unspecified cause, unspecified site  -     allopurinol (ZYLOPRIM) 100 MG tablet; Take 1 tablet by mouth Daily.  Dispense: 90 tablet; Refill: 1      Follow Up    No follow-ups on file.  Patient was given instructions and counseling regarding his condition or for health maintenance advice. Please see specific information pulled into the AVS if appropriate.

## 2023-06-14 PROBLEM — I25.810 CORONARY ARTERY DISEASE INVOLVING CORONARY BYPASS GRAFT OF NATIVE HEART WITHOUT ANGINA PECTORIS: Status: ACTIVE | Noted: 2023-06-14

## 2023-07-02 PROBLEM — I25.10 CORONARY ARTERY DISEASE INVOLVING NATIVE CORONARY ARTERY OF NATIVE HEART WITHOUT ANGINA PECTORIS: Status: ACTIVE | Noted: 2023-06-14

## 2023-08-15 ENCOUNTER — TELEPHONE (OUTPATIENT)
Dept: CARDIOLOGY | Facility: CLINIC | Age: 73
End: 2023-08-15
Payer: MEDICARE

## 2023-08-15 NOTE — TELEPHONE ENCOUNTER
Received VM from patient wanting lab results from July SW patient. Results and recommendations given

## 2023-08-28 ENCOUNTER — LAB (OUTPATIENT)
Dept: LAB | Facility: HOSPITAL | Age: 73
End: 2023-08-28
Payer: MEDICARE

## 2023-08-28 ENCOUNTER — TELEPHONE (OUTPATIENT)
Dept: CARDIOLOGY | Facility: CLINIC | Age: 73
End: 2023-08-28
Payer: MEDICARE

## 2023-08-28 DIAGNOSIS — E78.2 MIXED HYPERLIPIDEMIA: ICD-10-CM

## 2023-08-28 DIAGNOSIS — I10 ESSENTIAL HYPERTENSION: ICD-10-CM

## 2023-08-28 LAB
ALBUMIN SERPL-MCNC: 4.7 G/DL (ref 3.5–5.2)
ALBUMIN/GLOB SERPL: 1.7 G/DL
ALP SERPL-CCNC: 67 U/L (ref 39–117)
ALT SERPL W P-5'-P-CCNC: 32 U/L (ref 1–41)
ANION GAP SERPL CALCULATED.3IONS-SCNC: 10.6 MMOL/L (ref 5–15)
AST SERPL-CCNC: 31 U/L (ref 1–40)
BILIRUB SERPL-MCNC: 0.2 MG/DL (ref 0–1.2)
BUN SERPL-MCNC: 16 MG/DL (ref 8–23)
BUN/CREAT SERPL: 15.4 (ref 7–25)
CALCIUM SPEC-SCNC: 9.7 MG/DL (ref 8.6–10.5)
CHLORIDE SERPL-SCNC: 100 MMOL/L (ref 98–107)
CHOLEST SERPL-MCNC: 182 MG/DL (ref 0–200)
CO2 SERPL-SCNC: 29.4 MMOL/L (ref 22–29)
CREAT SERPL-MCNC: 1.04 MG/DL (ref 0.76–1.27)
EGFRCR SERPLBLD CKD-EPI 2021: 75.8 ML/MIN/1.73
GLOBULIN UR ELPH-MCNC: 2.8 GM/DL
GLUCOSE SERPL-MCNC: 94 MG/DL (ref 65–99)
HDLC SERPL-MCNC: 43 MG/DL (ref 40–60)
LDLC SERPL CALC-MCNC: 101 MG/DL (ref 0–100)
LDLC/HDLC SERPL: 2.19 {RATIO}
POTASSIUM SERPL-SCNC: 4.8 MMOL/L (ref 3.5–5.2)
PROT SERPL-MCNC: 7.5 G/DL (ref 6–8.5)
SODIUM SERPL-SCNC: 140 MMOL/L (ref 136–145)
TRIGL SERPL-MCNC: 225 MG/DL (ref 0–150)
VLDLC SERPL-MCNC: 38 MG/DL (ref 5–40)

## 2023-08-28 PROCEDURE — 36415 COLL VENOUS BLD VENIPUNCTURE: CPT

## 2023-08-28 PROCEDURE — 80061 LIPID PANEL: CPT

## 2023-08-28 PROCEDURE — 80053 COMPREHEN METABOLIC PANEL: CPT

## 2023-08-28 NOTE — TELEPHONE ENCOUNTER
"  Hub staff attempted to follow warm transfer process and was unsuccessful     Caller: Telly Meneses \"Jeyson\"    Relationship to patient: Self    Best call back number: [phone]     Patient is needing: PATIENT IS NEEDING TO RSD MISSED LAB WORK APPT AND POSSIBLE ECHO.      "

## 2023-08-31 ENCOUNTER — TELEPHONE (OUTPATIENT)
Dept: CARDIOLOGY | Facility: CLINIC | Age: 73
End: 2023-08-31
Payer: MEDICARE

## 2023-08-31 NOTE — TELEPHONE ENCOUNTER
Called patient and he states that someone called him already and told him to double his pravastain so he has been taking 2 of the 40mg (there is no documentation saying this). Is he ok to double or still recommend changing to atorvastain. Please advise.

## 2023-08-31 NOTE — TELEPHONE ENCOUNTER
----- Message from CHRISSY Alvarenga sent at 8/29/2023  9:20 AM EDT -----  Recent labs show his electrolytes and renal function to be normal.  Cholesterol panel shows his cholesterol level is still above goal, recommend changing pravastatin to a similar medication called atorvastatin.  If patient is agreeable we can send prescription for atorvastatin 40 mg nightly to pharmacy.

## 2023-08-31 NOTE — TELEPHONE ENCOUNTER
Since he has coronary disease I would actually recommend he go ahead and switch over to atorvastatin.

## 2023-09-04 DIAGNOSIS — G47.00 INSOMNIA, UNSPECIFIED TYPE: ICD-10-CM

## 2023-09-04 DIAGNOSIS — F41.9 ANXIETY: ICD-10-CM

## 2023-09-06 RX ORDER — HYDROXYZINE HYDROCHLORIDE 25 MG/1
TABLET, FILM COATED ORAL
Qty: 60 TABLET | Refills: 0 | Status: SHIPPED | OUTPATIENT
Start: 2023-09-06

## 2023-09-13 ENCOUNTER — OFFICE VISIT (OUTPATIENT)
Dept: FAMILY MEDICINE CLINIC | Age: 73
End: 2023-09-13
Payer: MEDICARE

## 2023-09-13 ENCOUNTER — HOSPITAL ENCOUNTER (OUTPATIENT)
Dept: GENERAL RADIOLOGY | Facility: HOSPITAL | Age: 73
Discharge: HOME | End: 2023-09-13
Admitting: NURSE PRACTITIONER
Payer: MEDICARE

## 2023-09-13 VITALS
OXYGEN SATURATION: 96 % | WEIGHT: 187 LBS | HEART RATE: 67 BPM | BODY MASS INDEX: 28.34 KG/M2 | SYSTOLIC BLOOD PRESSURE: 133 MMHG | HEIGHT: 68 IN | DIASTOLIC BLOOD PRESSURE: 78 MMHG

## 2023-09-13 DIAGNOSIS — M25.552 BILATERAL HIP PAIN: ICD-10-CM

## 2023-09-13 DIAGNOSIS — M1A.9XX0 CHRONIC GOUT WITHOUT TOPHUS, UNSPECIFIED CAUSE, UNSPECIFIED SITE: ICD-10-CM

## 2023-09-13 DIAGNOSIS — I10 PRIMARY HYPERTENSION: ICD-10-CM

## 2023-09-13 DIAGNOSIS — M25.551 BILATERAL HIP PAIN: ICD-10-CM

## 2023-09-13 DIAGNOSIS — G47.00 INSOMNIA, UNSPECIFIED TYPE: ICD-10-CM

## 2023-09-13 DIAGNOSIS — Z00.00 MEDICARE ANNUAL WELLNESS VISIT, SUBSEQUENT: Primary | ICD-10-CM

## 2023-09-13 DIAGNOSIS — F41.9 ANXIETY: ICD-10-CM

## 2023-09-13 PROBLEM — Z97.2 WEARS DENTURES: Status: ACTIVE | Noted: 2023-09-13

## 2023-09-13 PROCEDURE — 73502 X-RAY EXAM HIP UNI 2-3 VIEWS: CPT

## 2023-09-13 RX ORDER — ALLOPURINOL 100 MG/1
100 TABLET ORAL DAILY
Qty: 90 TABLET | Refills: 1 | Status: SHIPPED | OUTPATIENT
Start: 2023-09-13

## 2023-09-13 RX ORDER — HYDROXYZINE HYDROCHLORIDE 25 MG/1
50 TABLET, FILM COATED ORAL NIGHTLY PRN
Qty: 60 TABLET | Refills: 5 | Status: SHIPPED | OUTPATIENT
Start: 2023-09-13

## 2023-09-13 RX ORDER — LISINOPRIL AND HYDROCHLOROTHIAZIDE 20; 12.5 MG/1; MG/1
1 TABLET ORAL DAILY
Qty: 90 TABLET | Refills: 1 | Status: SHIPPED | OUTPATIENT
Start: 2023-09-13

## 2023-09-13 NOTE — PROGRESS NOTES
Subsequent Medicare Wellness Visit  The ABC's of Medicare Wellness Visit  Chief Complaint   Patient presents with    Medicare Wellness-subsequent    Nasal Congestion     Declines testing        Subjective   History of Present Illness      Jeyson is a 73 y.o. male who presents   for a Subsequent Medicare Wellness Visit.    Has had an eye exam within the last 1 year and got new glasses.  Has full dentures.  Negative lung cancer screening chest CT 7/1/2022.  Patient defers any further screening at this time.  Defers COVID or shingles vaccine or abdominal aortic aneurysm ultrasound.  Patient feels as if sleep issues are due to working third shift for many years.  He has no difficulty falling asleep but has difficulty staying asleep.  Previously diagnosed with sleep apnea but could not tolerate CPAP.  States energy level is good during the day.  Hydroxyzine 25 mg at bedtime helps mildly.  He states he will try higher dose of hydroxyzine at bedtime and follow-up if not improving.  Sertraline 50 mg daily does help anxiety mildly.  Patient states he has no flares of gout as long as he continues taking allopurinol 100 mg daily.  Denies medication side effects and requests refills.  Current smoker.  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  He denies this being bothersome and defers hearing testing.  Does have seasonal allergies and currently using Flonase nasal spray.    Patient reports intermittent bilateral hip pain without injury.  He suspects arthritis.  He has not had x-ray imaging done of his hips.    Does the patient have evidence of cognitive impairment?   No    Aspirin use counseling:Does not need ASA (and currently is not on it)    Recent Hospitalizations:  No hospitalization(s) within the last year.    Advanced Care Planning:  ACP discussion was held with the patient during this visit. Patient has an advance directive in EMR which is still valid.      The following portions of the patient's history were reviewed   and updated as appropriate: allergies, current medications, past family history, past medical history, past social history, past surgical history, and problem list.    Compared to one year ago, the patient feels his   physical health is the same.  Compared to one year ago, the patient feels his   mental health is the same.    Reviewed chart for potential of high risk medication in the elderly:  yes  Reviewed chart for potential of harmful drug interactions in the elderly:  yes    BMI is >= 25 and <30. (Overweight) The following options were offered after discussion;: nutrition counseling/recommendations       HEALTH RISK ASSESSMENT BEGIN  Fall Risk Screen:  BREANNA Fall Risk Assessment was completed, and patient is at LOW risk for falls.Assessment completed on:2023    Depression Screen:       2023     8:19 AM   PHQ-2/PHQ-9 Depression Screening   Little Interest or Pleasure in Doing Things 0-->not at all   Feeling Down, Depressed or Hopeless 1-->several days   PHQ-9: Brief Depression Severity Measure Score 1       Current Medical Providers:  Patient Care Team:  Kathrin Malagon APRN as PCP - General (Nurse Practitioner)    Smoking Status:  Social History     Tobacco Use   Smoking Status Every Day    Packs/day: 0.50    Years: 55.00    Pack years: 27.50    Types: Cigarettes    Start date:    Smokeless Tobacco Former    Types: Chew    Quit date:    Tobacco Comments    Used to smoke 1 ppd        Alcohol Consumption:  Social History     Substance and Sexual Activity   Alcohol Use Yes    Alcohol/week: 7.0 standard drinks    Types: 7 Cans of beer per week       Health Habits and Functional and Cognitive Screenin/13/2023     8:20 AM   Functional & Cognitive Status   Do you have difficulty preparing food and eating? No   Do you have difficulty bathing yourself, getting dressed or grooming yourself? No   Do you have difficulty using  the toilet? No   Do you have difficulty moving around from place to place? No   Do you have trouble with steps or getting out of a bed or a chair? Yes   Current Diet Well Balanced Diet        Dental Exam Comment dentures    Eye Exam Up to date   Exercise (times per week) 0 times per week   Current Exercises Include No Regular Exercise   Do you need help using the phone?  No   Are you deaf or do you have serious difficulty hearing?  Yes   Do you need help to go to places out of walking distance? Yes   Do you need help shopping? No   Do you need help preparing meals?  No   Do you need help with housework?  No   Do you need help with laundry? No   Do you need help taking your medications? No   Do you need help managing money? No   Do you ever drive or ride in a car without wearing a seat belt? No   Have you felt unusual stress, anger or loneliness in the last month? Yes   If you need help, do you have trouble finding someone available to you? No   Have you been bothered in the last four weeks by sexual problems? No   Do you have difficulty concentrating, remembering or making decisions? Yes       Age-appropriate Screening Schedule:  Refer to the list below for future screening recommendations based on patient's age, sex and/or medical conditions. Orders for these recommended tests are listed in the plan section. The patient has been provided with a written plan.    Health Maintenance   Topic Date Due    AAA SCREEN (ONE-TIME)  09/13/2023 (Originally 5/31/2022)    ZOSTER VACCINE (1 of 2) 09/13/2023 (Originally 4/8/2000)    COVID-19 Vaccine (4 - Moderna series) 09/15/2023 (Originally 1/17/2022)    LUNG CANCER SCREENING  09/13/2024 (Originally 7/1/2023)    INFLUENZA VACCINE  10/01/2023    BMI FOLLOWUP  06/14/2024    COLORECTAL CANCER SCREENING  07/24/2024    LIPID PANEL  08/28/2024    ANNUAL WELLNESS VISIT  09/13/2024    TDAP/TD VACCINES (2 - Td or Tdap) 02/25/2026    HEPATITIS C SCREENING  Completed    Pneumococcal  "Vaccine 65+  Completed     HEALTH RISK ASSESSMENT END    Outpatient Medications Prior to Visit   Medication Sig Dispense Refill    albuterol sulfate  (90 Base) MCG/ACT inhaler Inhale 2 puffs Every 4 (Four) Hours As Needed for Wheezing. 18 g 0    fluticasone (Flonase) 50 MCG/ACT nasal spray 2 sprays into the nostril(s) as directed by provider Daily. 11.1 mL 0    nitroglycerin (NITROSTAT) 0.4 MG SL tablet Place 1 tablet under the tongue Every 5 (Five) Minutes As Needed for Chest Pain. Take no more than 3 doses in 15 minutes. 100 tablet 0    pravastatin (PRAVACHOL) 40 MG tablet Take 1 tablet by mouth Daily. 90 tablet 1    allopurinol (ZYLOPRIM) 100 MG tablet Take 1 tablet by mouth Daily. 90 tablet 1    hydrOXYzine (ATARAX) 25 MG tablet TAKE 1 TABLET BY MOUTH THREE TIMES DAILY AS NEEDED FOR ANXIETY 60 tablet 0    lisinopril-hydrochlorothiazide (PRINZIDE,ZESTORETIC) 20-12.5 MG per tablet Take 1 tablet by mouth Daily. 90 tablet 1    sertraline (Zoloft) 50 MG tablet Take  1 tablet daily 90 tablet 1     No facility-administered medications prior to visit.       Patient Active Problem List   Diagnosis    Essential hypertension    Anxiety    Mixed hyperlipidemia    Tinnitus    History of prostate cancer    Insomnia    Coronary artery disease involving native coronary artery of native heart without angina pectoris    Medicare annual wellness visit, subsequent    Chronic gout without tophus    Primary hypertension    Bilateral hip pain    Wears dentures            Objective      Vitals:    09/13/23 0812   BP: 133/78   BP Location: Left arm   Patient Position: Sitting   Cuff Size: Adult   Pulse: 67   SpO2: 96%   Weight: 84.8 kg (187 lb)   Height: 172.7 cm (68\")       Physical Exam  Vitals reviewed.   Constitutional:       General: He is not in acute distress.     Appearance: Normal appearance. He is well-developed.   HENT:      Right Ear: Tympanic membrane normal.      Left Ear: Tympanic membrane normal.      " Mouth/Throat:      Mouth: Mucous membranes are moist.      Pharynx: Oropharynx is clear. No oropharyngeal exudate or posterior oropharyngeal erythema.   Neck:      Thyroid: No thyromegaly.      Vascular: No carotid bruit.   Cardiovascular:      Rate and Rhythm: Normal rate and regular rhythm.      Heart sounds: Normal heart sounds.   Pulmonary:      Effort: Pulmonary effort is normal.      Breath sounds: Normal breath sounds.   Musculoskeletal:      Right lower leg: No edema.      Left lower leg: No edema.   Lymphadenopathy:      Cervical: No cervical adenopathy.   Skin:     General: Skin is warm and dry.   Neurological:      General: No focal deficit present.      Mental Status: He is alert.   Psychiatric:         Attention and Perception: Attention normal.         Mood and Affect: Mood and affect normal.         Behavior: Behavior normal.           Result Review :     Lab Results   Component Value Date    TRIG 225 (H) 08/28/2023    HDL 43 08/28/2023     (H) 08/28/2023    VLDL 38 08/28/2023        Lab Results - Last 18 Months   Lab Units 08/28/23  0933 07/05/23  1343 02/09/23  1113   BUN mg/dL 16 22 16   CREATININE mg/dL 1.04 1.29* 1.00   SODIUM mmol/L 140 141 138   POTASSIUM mmol/L 4.8 4.2 4.5   CHLORIDE mmol/L 100 101 100   CALCIUM mg/dL 9.7 10.7* 9.6   ALBUMIN g/dL 4.7 4.8 4.7   BILIRUBIN mg/dL 0.2 0.5 0.3   ALK PHOS U/L 67 69 66   AST (SGOT) U/L 31 28 26   ALT (SGPT) U/L 32 25 22   TRIGLYCERIDES mg/dL 225* 158* 91   HDL CHOL mg/dL 43 45 55   VLDL CHOL mg/dL 38 28 17   LDL CHOL mg/dL 101* 100 105*   LDL/HDL RATIO  2.19 2.14 1.89   TSH uIU/mL  --   --  1.010       The following data was reviewed by: CHRISSY Nguyen on 09/13/2023:    Assessment & Plan   Assessment and Plan      Diagnoses and all orders for this visit:    1. Medicare annual wellness visit, subsequent (Primary)  Assessment & Plan:  Preventive care measures are discussed.  Labs recently done per cardiology reviewed with patient.      2.  Anxiety  -     hydrOXYzine (ATARAX) 25 MG tablet; Take 2 tablets by mouth At Night As Needed (insomnia). for anxiety  Dispense: 60 tablet; Refill: 5  -     sertraline (Zoloft) 50 MG tablet; Take  1 tablet daily  Dispense: 90 tablet; Refill: 1    3. Chronic gout without tophus, unspecified cause, unspecified site  -     allopurinol (ZYLOPRIM) 100 MG tablet; Take 1 tablet by mouth Daily.  Dispense: 90 tablet; Refill: 1    4. Insomnia, unspecified type  Assessment & Plan:  I higher dose of hydroxyzine for the next 1 month.  He will let me know if this is not effective.  We may consider increasing dose of sertraline.    Orders:  -     hydrOXYzine (ATARAX) 25 MG tablet; Take 2 tablets by mouth At Night As Needed (insomnia). for anxiety  Dispense: 60 tablet; Refill: 5    5. Primary hypertension  -     lisinopril-hydrochlorothiazide (PRINZIDE,ZESTORETIC) 20-12.5 MG per tablet; Take 1 tablet by mouth Daily.  Dispense: 90 tablet; Refill: 1    6. Bilateral hip pain  Assessment & Plan:  Further treatment recommendations pending x-ray results    Orders:  -     XR Hip With or Without Pelvis 2 - 3 View Left; Future  -     XR Hip With or Without Pelvis 2 - 3 View Right; Future        Medicare Risks and Personalized Health Plan  CMS Preventative Services Quick Reference  Abdominal Aortic Aneurysm Screening  Cardiovascular risk  Depression/Dysphoria  Hearing Problem  Immunizations Discussed/Encouraged (specific immunizations; Shingrix and COVID19 )  Lung Cancer Risk    The above risks/problems have been discussed with the patient.  Pertinent information has been shared with the patient in the   After Visit Summary. Follow up plans and orders are seen below   in the Assessment/Plan Section.    I spent 50 minutes caring for Telly on this date of service. This time includes time spent by me in the following activities:preparing for the visit, reviewing tests, obtaining and/or reviewing a separately obtained history, performing a  medically appropriate examination and/or evaluation , counseling and educating the patient/family/caregiver, ordering medications, tests, or procedures, and documenting information in the medical record    Follow Up   No follow-ups on file.     An After Visit Summary and PPPS were given to the patient.

## 2023-09-13 NOTE — PROGRESS NOTES
Hip x-rays are normal.  Consider referral to physical therapy or if this could be related to any low back pain.  If that were the case I would get low back x-rays done.

## 2023-09-13 NOTE — ASSESSMENT & PLAN NOTE
I higher dose of hydroxyzine for the next 1 month.  He will let me know if this is not effective.  We may consider increasing dose of sertraline.

## 2023-09-13 NOTE — PROGRESS NOTES
Normal x-rays of both hips.  If symptoms persist consider referral to physical therapy or if we need to get x-ray imaging of your low back.

## 2023-09-26 ENCOUNTER — OFFICE VISIT (OUTPATIENT)
Dept: FAMILY MEDICINE CLINIC | Age: 73
End: 2023-09-26
Payer: MEDICARE

## 2023-09-26 VITALS
OXYGEN SATURATION: 100 % | DIASTOLIC BLOOD PRESSURE: 73 MMHG | WEIGHT: 178 LBS | HEART RATE: 75 BPM | SYSTOLIC BLOOD PRESSURE: 116 MMHG | BODY MASS INDEX: 26.98 KG/M2 | HEIGHT: 68 IN

## 2023-09-26 DIAGNOSIS — K21.9 GASTROESOPHAGEAL REFLUX DISEASE WITHOUT ESOPHAGITIS: Primary | ICD-10-CM

## 2023-09-26 DIAGNOSIS — E78.2 MIXED HYPERLIPIDEMIA: ICD-10-CM

## 2023-09-26 PROCEDURE — 3078F DIAST BP <80 MM HG: CPT | Performed by: NURSE PRACTITIONER

## 2023-09-26 PROCEDURE — 3074F SYST BP LT 130 MM HG: CPT | Performed by: NURSE PRACTITIONER

## 2023-09-26 PROCEDURE — 99213 OFFICE O/P EST LOW 20 MIN: CPT | Performed by: NURSE PRACTITIONER

## 2023-09-26 PROCEDURE — 1160F RVW MEDS BY RX/DR IN RCRD: CPT | Performed by: NURSE PRACTITIONER

## 2023-09-26 PROCEDURE — 1159F MED LIST DOCD IN RCRD: CPT | Performed by: NURSE PRACTITIONER

## 2023-09-26 RX ORDER — PRAVASTATIN SODIUM 40 MG
40 TABLET ORAL DAILY
Qty: 90 TABLET | Refills: 1 | Status: CANCELLED | OUTPATIENT
Start: 2023-09-26

## 2023-09-26 NOTE — PROGRESS NOTES
"Chief Complaint  Hypertension (6 month follow up ), Anxiety, Gout, Insomnia, and Hyperlipidemia    Subjective          Telly Meneses presents to [facility]     Patient is a 73-year-old male who is here today for routine follow-up and had Medicare wellness exam 2 weeks ago and all chronic medications were refilled.  Patient states he does  purchase Prevacid over-the-counter to take for acid reflux symptoms which are stable.  He states unspecified provider that he saw upstairs in this building recommended Prevacid.  He has had an upper scope done.  He has taken Prevacid for for 5 years.    Patient has not yet tried higher dose of hydroxyzine at bedtime.  He still was taking 25 mg at bedtime.  He is aware he can try 50 mg at bedtime if he would like.    Patient has had complete prostate removal.  Recently had labs done per cardiology.  Pravastatin dose was increased by cardiology to 40 mg taking 2 tablets daily.     Objective   Vital Signs:   Vitals:    09/26/23 0850   BP: 116/73   BP Location: Left arm   Patient Position: Sitting   Cuff Size: Adult   Pulse: 75   SpO2: 100%   Weight: 80.7 kg (178 lb)   Height: 172.7 cm (68\")       Wt Readings from Last 3 Encounters:   09/26/23 80.7 kg (178 lb)   09/13/23 84.8 kg (187 lb)   06/14/23 84.4 kg (186 lb)      BP Readings from Last 3 Encounters:   09/26/23 116/73   09/13/23 133/78   06/14/23 143/76       Body mass index is 27.06 kg/m².             Physical Exam  Vitals reviewed.   Constitutional:       General: He is not in acute distress.     Appearance: Normal appearance. He is well-developed.   Neck:      Thyroid: No thyromegaly.      Vascular: No carotid bruit.   Cardiovascular:      Rate and Rhythm: Normal rate and regular rhythm.      Pulses:           Posterior tibial pulses are 2+ on the right side and 2+ on the left side.      Heart sounds: Normal heart sounds.   Pulmonary:      Effort: Pulmonary effort is normal.      Breath " sounds: Normal breath sounds.   Musculoskeletal:      Right lower leg: No edema.      Left lower leg: No edema.   Skin:     General: Skin is warm and dry.   Neurological:      General: No focal deficit present.      Mental Status: He is alert.   Psychiatric:         Attention and Perception: Attention normal.         Mood and Affect: Mood and affect normal.         Behavior: Behavior normal.         Current Outpatient Medications:     albuterol sulfate  (90 Base) MCG/ACT inhaler, Inhale 2 puffs Every 4 (Four) Hours As Needed for Wheezing., Disp: 18 g, Rfl: 0    allopurinol (ZYLOPRIM) 100 MG tablet, Take 1 tablet by mouth Daily., Disp: 90 tablet, Rfl: 1    fluticasone (Flonase) 50 MCG/ACT nasal spray, 2 sprays into the nostril(s) as directed by provider Daily., Disp: 11.1 mL, Rfl: 0    hydrOXYzine (ATARAX) 25 MG tablet, Take 2 tablets by mouth At Night As Needed (insomnia). for anxiety, Disp: 60 tablet, Rfl: 5    lisinopril-hydrochlorothiazide (PRINZIDE,ZESTORETIC) 20-12.5 MG per tablet, Take 1 tablet by mouth Daily., Disp: 90 tablet, Rfl: 1    nitroglycerin (NITROSTAT) 0.4 MG SL tablet, Place 1 tablet under the tongue Every 5 (Five) Minutes As Needed for Chest Pain. Take no more than 3 doses in 15 minutes., Disp: 100 tablet, Rfl: 0    pravastatin (PRAVACHOL) 40 MG tablet, Take 1 tablet by mouth Daily. (Patient taking differently: Take 2 tablets by mouth Daily.), Disp: 90 tablet, Rfl: 1    sertraline (Zoloft) 50 MG tablet, Take  1 tablet daily, Disp: 90 tablet, Rfl: 1   Past Medical History:   Diagnosis Date    Anxiety     Heart murmur     Hyperlipidemia     Hypertension     Hypothyroidism     Prostate cancer      Allergies   Allergen Reactions    Venlafaxine GI Intolerance               Result Review :     Common labs          2/9/2023    11:13 7/5/2023    13:43 8/28/2023    09:33   Common Labs   Glucose 98  124  94    BUN 16  22  16    Creatinine 1.00  1.29  1.04    Sodium 138  141  140    Potassium 4.5  4.2   4.8    Chloride 100  101  100    Calcium 9.6  10.7  9.7    Albumin 4.7  4.8  4.7    Total Bilirubin 0.3  0.5  0.2    Alkaline Phosphatase 66  69  67    AST (SGOT) 26  28  31    ALT (SGPT) 22  25  32    Total Cholesterol 177  173  182    Triglycerides 91  158  225    HDL Cholesterol 55  45  43    LDL Cholesterol  105  100  101         XR Hip With or Without Pelvis 2 - 3 View Left    Result Date: 9/13/2023    1. No acute bony abnormality or significant degenerative change of the hips.      YANETH WHITEHEAD MD       Electronically Signed and Approved By: YANETH WHITEHEAD MD on 9/13/2023 at 11:57             XR Hip With or Without Pelvis 2 - 3 View Right    Result Date: 9/13/2023    1. No acute bony abnormality or significant degenerative change of the right hip.      YANETH WHITEHEAD MD       Electronically Signed and Approved By: YANETH WHITEHEAD MD on 9/13/2023 at 11:56                      Social History     Tobacco Use   Smoking Status Every Day    Packs/day: 0.50    Years: 55.00    Pack years: 27.50    Types: Cigarettes    Start date: 1968   Smokeless Tobacco Former    Types: Chew    Quit date: 1985   Tobacco Comments    Used to smoke 1 ppd            Assessment and Plan    Diagnoses and all orders for this visit:    1. Gastroesophageal reflux disease without esophagitis (Primary)  Assessment & Plan:  Discussed potential reflux triggers.  We will attempt to locate reports of previous upper scope.          Follow Up    No follow-ups on file.  Patient was given instructions and counseling regarding his condition or for health maintenance advice. Please see specific information pulled into the AVS if appropriate.

## 2023-09-26 NOTE — TELEPHONE ENCOUNTER
"Caller: Telly Meneses \"Jeyson\"    Relationship: Self    Best call back number: [phone]     Requested Prescriptions:   Requested Prescriptions     Pending Prescriptions Disp Refills    pravastatin (PRAVACHOL) 40 MG tablet 90 tablet 1     Sig: Take 1 tablet by mouth Daily.        Pharmacy where request should be sent: [facility] [address] [place] [address] CHITRA SALCEDO [facility] [phone] [facility] [phone]      Last office visit with prescribing clinician: 6/14/2023   Last telemedicine visit with prescribing clinician: Visit date not found   Next office visit with prescribing clinician: 10/18/2023     Additional details provided by patient: 3 DAYS LEFT; PATIENT REPORTS DR. RENDON TOLD HIM TO DOUBLE HIS MEDICATION AND THE PRESCRIPTION DOES NOT REFLECT THIS    Does the patient have less than a 3 day supply:  [] Yes  [x] No    Would you like a call back once the refill request has been completed: [] Yes [] No    If the office needs to give you a call back, can they leave a voicemail: [] Yes [] No    Teodoro Peterson Rep   09/26/23 11:21 EDT     "

## 2023-09-27 RX ORDER — ATORVASTATIN CALCIUM 40 MG/1
40 TABLET, FILM COATED ORAL DAILY
Qty: 90 TABLET | Refills: 0 | Status: SHIPPED | OUTPATIENT
Start: 2023-09-27

## 2023-09-27 NOTE — TELEPHONE ENCOUNTER
His previous office visit we did discuss increasing dose of pravastatin, however after reviewing his most recent labs, recommendation was to change pravastatin over to atorvastatin.  Please tell him at this point we recommend discontinuing the pravastatin, and send a prescription for atorvastatin 40 mg nightly to pharmacy.

## 2023-09-27 NOTE — TELEPHONE ENCOUNTER
MEDICATION CHANGE MADE AND ATORVASTATIN RX SENT TO PHARMACY PER CHRISSY ROMO.  TRIED TO CALL PT TWICE.  NO ANSWER.  LEFT DETAILED VM NOTIFYING HIM OF MED CHANGE AND TO LET US KNOW IF HE HAS ANY QUESTIONS.

## 2023-10-18 ENCOUNTER — OFFICE VISIT (OUTPATIENT)
Dept: CARDIOLOGY | Facility: CLINIC | Age: 73
End: 2023-10-18
Payer: MEDICARE

## 2023-10-18 VITALS
HEART RATE: 60 BPM | SYSTOLIC BLOOD PRESSURE: 112 MMHG | BODY MASS INDEX: 27.74 KG/M2 | HEIGHT: 68 IN | WEIGHT: 183 LBS | DIASTOLIC BLOOD PRESSURE: 67 MMHG

## 2023-10-18 DIAGNOSIS — I10 ESSENTIAL HYPERTENSION: ICD-10-CM

## 2023-10-18 DIAGNOSIS — E78.2 MIXED HYPERLIPIDEMIA: ICD-10-CM

## 2023-10-18 DIAGNOSIS — I25.10 CORONARY ARTERY DISEASE INVOLVING NATIVE CORONARY ARTERY OF NATIVE HEART WITHOUT ANGINA PECTORIS: Primary | ICD-10-CM

## 2023-10-18 PROCEDURE — 3078F DIAST BP <80 MM HG: CPT | Performed by: INTERNAL MEDICINE

## 2023-10-18 PROCEDURE — 3074F SYST BP LT 130 MM HG: CPT | Performed by: INTERNAL MEDICINE

## 2023-10-18 PROCEDURE — 1160F RVW MEDS BY RX/DR IN RCRD: CPT | Performed by: INTERNAL MEDICINE

## 2023-10-18 PROCEDURE — 99213 OFFICE O/P EST LOW 20 MIN: CPT | Performed by: INTERNAL MEDICINE

## 2023-10-18 PROCEDURE — 1159F MED LIST DOCD IN RCRD: CPT | Performed by: INTERNAL MEDICINE

## 2023-10-18 NOTE — ASSESSMENT & PLAN NOTE
Most recent LDL is 100, which is above goal.  He is currently on atorvastatin 40 mg nightly which will be continued.  We will repeat lipid panel before next visit.

## 2023-10-18 NOTE — ASSESSMENT & PLAN NOTE
He is currently stable with no angina.  Baseline ejection fraction unknown, he has an echocardiogram scheduled next week.  Recommend to continue aspirin and statin.

## 2023-10-18 NOTE — PROGRESS NOTES
CARDIOLOGY FOLLOW-UP PROGRESS NOTE        Chief Complaint  Coronary Artery Disease, Hypertension, and Hyperlipidemia    Subjective            Telly Meneses presents to [facility]  History of Present Illness      Mr. Amador is here for routine 6-month follow-up visit.  He was previously seen when he came to establish cardiac care.  Today he denies any new complaints.  Specifically denies recent episodes of chest pain, shortness of breath, palpitations or dizziness.  He had labs done 2 weeks back which showed LDL of 100.  Pravastatin was changed to atorvastatin.  He is tolerating the medication well without any side effects.  During last office visit, echocardiogram was ordered which is still pending.    Past History:    Medical History:  Past Medical History:   Diagnosis Date    Anxiety     Heart murmur     Hyperlipidemia     Hypertension     Hypothyroidism     Prostate cancer        Surgical History: has a past surgical history that includes Cardiac valve replacement and Prostate surgery.     Family History: family history includes Alzheimer's disease in his mother; Cancer in his sister; Diabetes in his daughter; Macular degeneration in his mother.     Social History: reports that he has been smoking cigarettes. He started smoking about 55 years ago. He has a 27.50 pack-year smoking history. He quit smokeless tobacco use about 38 years ago.  His smokeless tobacco use included chew. He reports current alcohol use of about 7.0 standard drinks of alcohol per week. He reports that he does not use drugs.    Allergies: Venlafaxine    Current Outpatient Medications on File Prior to Visit   Medication Sig    albuterol sulfate  (90 Base) MCG/ACT inhaler Inhale 2 puffs Every 4 (Four) Hours As Needed for Wheezing.    allopurinol (ZYLOPRIM) 100 MG tablet Take 1 tablet by mouth Daily.    atorvastatin (LIPITOR) 40 MG tablet Take 1 tablet by mouth Daily. (NIGHTLY)    fluticasone  "(Flonase) 50 MCG/ACT nasal spray 2 sprays into the nostril(s) as directed by provider Daily.    hydrOXYzine (ATARAX) 25 MG tablet Take 2 tablets by mouth At Night As Needed (insomnia). for anxiety    lisinopril-hydrochlorothiazide (PRINZIDE,ZESTORETIC) 20-12.5 MG per tablet Take 1 tablet by mouth Daily.    nitroglycerin (NITROSTAT) 0.4 MG SL tablet Place 1 tablet under the tongue Every 5 (Five) Minutes As Needed for Chest Pain. Take no more than 3 doses in 15 minutes.    sertraline (Zoloft) 50 MG tablet Take  1 tablet daily     No current facility-administered medications on file prior to visit.          Review of Systems   Respiratory:  Negative for cough, shortness of breath and wheezing.    Cardiovascular:  Negative for chest pain, palpitations and leg swelling.   Gastrointestinal:  Negative for nausea and vomiting.   Neurological:  Negative for dizziness and syncope.        Objective     /67   Pulse 60   Ht 172.7 cm (68\")   Wt 83 kg (183 lb)   BMI 27.83 kg/m²       Physical Exam    General : Alert, awake, no acute distress  Neck : Supple, no carotid bruit, no jugular venous distention  CVS : Regular rate and rhythm, no murmur, rubs or gallops  Lungs: Clear to auscultation bilaterally, no crackles or rhonchi  Abdomen: Soft, nontender, bowel sounds heard in all 4 quadrants  Extremities: Warm, well-perfused, no pedal edema    Result Review :     The following data was reviewed by: Samy Heredia MD on 10/18/2023:    CMP          2/9/2023    11:13 7/5/2023    13:43 8/28/2023    09:33   CMP   Glucose 98  124  94    BUN 16  22  16    Creatinine 1.00  1.29  1.04    EGFR 80.0  58.5  75.8    Sodium 138  141  140    Potassium 4.5  4.2  4.8    Chloride 100  101  100    Calcium 9.6  10.7  9.7    Total Protein 7.6  7.7  7.5    Albumin 4.7  4.8  4.7    Globulin 2.9  2.9  2.8    Total Bilirubin 0.3  0.5  0.2    Alkaline Phosphatase 66  69  67    AST (SGOT) 26  28  31    ALT (SGPT) 22  25  32    Albumin/Globulin " Ratio 1.6  1.7  1.7    BUN/Creatinine Ratio 16.0  17.1  15.4    Anion Gap 7.7  12.4  10.6        TSH          2/9/2023    11:13   TSH   TSH 1.010      Lipid Panel          2/9/2023    11:13 7/5/2023    13:43 8/28/2023    09:33   Lipid Panel   Total Cholesterol 177  173  182    Triglycerides 91  158  225    HDL Cholesterol 55  45  43    VLDL Cholesterol 17  28  38    LDL Cholesterol  105  100  101    LDL/HDL Ratio 1.89  2.14  2.19                  Assessment and Plan        Diagnoses and all orders for this visit:    1. Coronary artery disease involving native coronary artery of native heart without angina pectoris (Primary)  Assessment & Plan:  He is currently stable with no angina.  Baseline ejection fraction unknown, he has an echocardiogram scheduled next week.  Recommend to continue aspirin and statin.      2. Mixed hyperlipidemia  Assessment & Plan:  Most recent LDL is 100, which is above goal.  He is currently on atorvastatin 40 mg nightly which will be continued.  We will repeat lipid panel before next visit.      3. Essential hypertension  Assessment & Plan:  Blood pressure well controlled.  Continue lisinopril/HCTZ at the current dose.                Follow Up     Return in about 9 months (around 7/18/2024) for Next scheduled follow up.    Patient was given instructions and counseling regarding his condition or for health maintenance advice. Please see specific information pulled into the AVS if appropriate.

## 2023-10-27 ENCOUNTER — TELEPHONE (OUTPATIENT)
Dept: CARDIOLOGY | Facility: CLINIC | Age: 73
End: 2023-10-27
Payer: MEDICARE

## 2023-10-27 NOTE — TELEPHONE ENCOUNTER
----- Message from Sayda Camacho RN sent at 10/27/2023 12:44 PM EDT -----    ----- Message -----  From: Samy Heredia MD  Sent: 10/27/2023  12:25 PM EDT  To: Sayda Camacho RN    Echocardiogram showed normal heart function.  There are no major valvular problems.    No further cardiac testing needed at this time, follow-up as scheduled earlier.      Electronically signed by Samy Heredia MD, 10/27/23, 12:25 PM EDT.

## 2023-12-11 RX ORDER — ATORVASTATIN CALCIUM 40 MG/1
40 TABLET, FILM COATED ORAL
Qty: 90 TABLET | Refills: 0 | Status: SHIPPED | OUTPATIENT
Start: 2023-12-11

## 2023-12-20 ENCOUNTER — TELEPHONE (OUTPATIENT)
Dept: FAMILY MEDICINE CLINIC | Age: 73
End: 2023-12-20
Payer: MEDICARE

## 2023-12-20 NOTE — TELEPHONE ENCOUNTER
Caller: ESTHELA FREEMAN    Relationship: Emergency Contact    Best call back number: [phone]    What orders are you requesting (i.e. lab or imaging): LABS, CHOLESTEROL CHECK    In what timeframe would the patient need to come in: BEFORE APPOINTMENT 01/03/2024    Where will you receive your lab/imaging services: [facility]     Additional notes: PLEASE NOTIFY WHEN ORDERS ARE ENTERED

## 2023-12-21 DIAGNOSIS — E78.2 MIXED HYPERLIPIDEMIA: ICD-10-CM

## 2023-12-21 DIAGNOSIS — I10 ESSENTIAL HYPERTENSION: Primary | ICD-10-CM

## 2023-12-28 ENCOUNTER — LAB (OUTPATIENT)
Dept: LAB | Facility: HOSPITAL | Age: 73
End: 2023-12-28
Payer: MEDICARE

## 2023-12-28 DIAGNOSIS — I10 ESSENTIAL HYPERTENSION: ICD-10-CM

## 2023-12-28 DIAGNOSIS — E78.2 MIXED HYPERLIPIDEMIA: ICD-10-CM

## 2023-12-28 LAB
ALBUMIN SERPL-MCNC: 4.7 G/DL (ref 3.5–5.2)
ALBUMIN/GLOB SERPL: 2 G/DL
ALP SERPL-CCNC: 94 U/L (ref 39–117)
ALT SERPL W P-5'-P-CCNC: 29 U/L (ref 1–41)
ANION GAP SERPL CALCULATED.3IONS-SCNC: 11 MMOL/L (ref 5–15)
AST SERPL-CCNC: 25 U/L (ref 1–40)
BILIRUB SERPL-MCNC: <0.2 MG/DL (ref 0–1.2)
BUN SERPL-MCNC: 15 MG/DL (ref 8–23)
BUN/CREAT SERPL: 16.3 (ref 7–25)
CALCIUM SPEC-SCNC: 9.3 MG/DL (ref 8.6–10.5)
CHLORIDE SERPL-SCNC: 104 MMOL/L (ref 98–107)
CHOLEST SERPL-MCNC: 141 MG/DL (ref 0–200)
CO2 SERPL-SCNC: 24 MMOL/L (ref 22–29)
CREAT SERPL-MCNC: 0.92 MG/DL (ref 0.76–1.27)
EGFRCR SERPLBLD CKD-EPI 2021: 87.8 ML/MIN/1.73
GLOBULIN UR ELPH-MCNC: 2.4 GM/DL
GLUCOSE SERPL-MCNC: 104 MG/DL (ref 65–99)
HDLC SERPL-MCNC: 51 MG/DL (ref 40–60)
LDLC SERPL CALC-MCNC: 68 MG/DL (ref 0–100)
LDLC/HDLC SERPL: 1.29 {RATIO}
POTASSIUM SERPL-SCNC: 4.2 MMOL/L (ref 3.5–5.2)
PROT SERPL-MCNC: 7.1 G/DL (ref 6–8.5)
SODIUM SERPL-SCNC: 139 MMOL/L (ref 136–145)
TRIGL SERPL-MCNC: 121 MG/DL (ref 0–150)
VLDLC SERPL-MCNC: 22 MG/DL (ref 5–40)

## 2023-12-28 PROCEDURE — 36415 COLL VENOUS BLD VENIPUNCTURE: CPT

## 2023-12-28 PROCEDURE — 80053 COMPREHEN METABOLIC PANEL: CPT

## 2023-12-28 PROCEDURE — 80061 LIPID PANEL: CPT

## 2023-12-29 NOTE — PROGRESS NOTES
Blood sugar slightly elevated.  Kidney and liver function are normal.  Lipid panel looks much better.

## 2024-01-03 ENCOUNTER — OFFICE VISIT (OUTPATIENT)
Dept: FAMILY MEDICINE CLINIC | Age: 74
End: 2024-01-03
Payer: MEDICARE

## 2024-01-03 VITALS
TEMPERATURE: 97.7 F | SYSTOLIC BLOOD PRESSURE: 116 MMHG | DIASTOLIC BLOOD PRESSURE: 75 MMHG | WEIGHT: 194.8 LBS | HEIGHT: 68 IN | HEART RATE: 71 BPM | OXYGEN SATURATION: 94 % | BODY MASS INDEX: 29.52 KG/M2

## 2024-01-03 DIAGNOSIS — Z86.39 HISTORY OF UNDERACTIVE THYROID: ICD-10-CM

## 2024-01-03 DIAGNOSIS — R73.9 HYPERGLYCEMIA: Primary | ICD-10-CM

## 2024-01-03 DIAGNOSIS — R25.1 TREMOR OF BOTH HANDS: ICD-10-CM

## 2024-01-03 PROCEDURE — 3074F SYST BP LT 130 MM HG: CPT | Performed by: NURSE PRACTITIONER

## 2024-01-03 PROCEDURE — 3078F DIAST BP <80 MM HG: CPT | Performed by: NURSE PRACTITIONER

## 2024-01-03 PROCEDURE — 99213 OFFICE O/P EST LOW 20 MIN: CPT | Performed by: NURSE PRACTITIONER

## 2024-01-03 PROCEDURE — 1160F RVW MEDS BY RX/DR IN RCRD: CPT | Performed by: NURSE PRACTITIONER

## 2024-01-03 PROCEDURE — 1159F MED LIST DOCD IN RCRD: CPT | Performed by: NURSE PRACTITIONER

## 2024-01-03 NOTE — ASSESSMENT & PLAN NOTE
Neurological exam is within normal limits.  Gait is normal.  Could consider imaging of the head or referral to neurology especially if you are having headaches, unilateral weakness or numbness, shuffling gait, dizziness, or any other neurological concerns.  Handout provided on types of tremor.  Appears to have an essential tremor.  Is not interfering with his daily functioning.  Defers necessity of medication at this time.  States he will observe.  Follow-up if he has any of the symptoms listed above is further evaluation would be necessary.

## 2024-01-03 NOTE — PROGRESS NOTES
"Chief Complaint  Hyperlipidemia and Tremors    Subjective          Telly Meneses presents to [facility]     Patient is a 73-year-old male who is here today to discuss recent onset of hand tremor with either hand with intention.  Denies any resting tremors, unilateral weakness or numbness, headaches, blurred vision, shuffling gait, or dizziness.  Does drink significant amounts of caffeine but that is not a new finding.  He is a smoker.    Patient is due to check thyroid function.  At some point he was treated for hypothyroidism but had not been on treatment for more than 1 year and thyroid level was normal.     Objective   Vital Signs:   Vitals:    01/03/24 1307   BP: 116/75   BP Location: Right arm   Patient Position: Sitting   Cuff Size: Adult   Pulse: 71   Temp: 97.7 °F (36.5 °C)   TempSrc: Oral   SpO2: 94%   Weight: 88.4 kg (194 lb 12.8 oz)   Height: 172.7 cm (68\")       Wt Readings from Last 3 Encounters:   01/03/24 88.4 kg (194 lb 12.8 oz)   10/25/23 83 kg (183 lb)   10/18/23 83 kg (183 lb)      BP Readings from Last 3 Encounters:   01/03/24 116/75   10/25/23 112/67   10/18/23 112/67       Body mass index is 29.62 kg/m².             Physical Exam  Vitals reviewed.   Constitutional:       General: He is not in acute distress.     Appearance: Normal appearance. He is well-developed.   Eyes:      Extraocular Movements: Extraocular movements intact.      Pupils: Pupils are equal, round, and reactive to light.   Cardiovascular:      Rate and Rhythm: Normal rate and regular rhythm.      Heart sounds: Normal heart sounds.   Pulmonary:      Effort: Pulmonary effort is normal.      Breath sounds: Normal breath sounds.   Musculoskeletal:         General: Normal range of motion.      Right lower leg: No edema.      Left lower leg: No edema.   Skin:     General: Skin is warm and dry.   Neurological:      General: No focal deficit present.      Mental Status: He is alert and oriented " to person, place, and time.      Cranial Nerves: No cranial nerve deficit.      Sensory: No sensory deficit.      Motor: No weakness.      Coordination: Coordination normal.      Gait: Gait normal.   Psychiatric:         Attention and Perception: Attention normal.         Mood and Affect: Mood and affect normal.         Behavior: Behavior normal.           Current Outpatient Medications:     albuterol sulfate  (90 Base) MCG/ACT inhaler, Inhale 2 puffs Every 4 (Four) Hours As Needed for Wheezing., Disp: 18 g, Rfl: 0    allopurinol (ZYLOPRIM) 100 MG tablet, Take 1 tablet by mouth Daily., Disp: 90 tablet, Rfl: 1    atorvastatin (LIPITOR) 40 MG tablet, TAKE 1 TABLET BY MOUTH ONCE DAILY NIGHTLY, Disp: 90 tablet, Rfl: 0    fluticasone (Flonase) 50 MCG/ACT nasal spray, 2 sprays into the nostril(s) as directed by provider Daily., Disp: 11.1 mL, Rfl: 0    hydrOXYzine (ATARAX) 25 MG tablet, Take 2 tablets by mouth At Night As Needed (insomnia). for anxiety, Disp: 60 tablet, Rfl: 5    lisinopril-hydrochlorothiazide (PRINZIDE,ZESTORETIC) 20-12.5 MG per tablet, Take 1 tablet by mouth Daily., Disp: 90 tablet, Rfl: 1    nitroglycerin (NITROSTAT) 0.4 MG SL tablet, Place 1 tablet under the tongue Every 5 (Five) Minutes As Needed for Chest Pain. Take no more than 3 doses in 15 minutes., Disp: 100 tablet, Rfl: 0    sertraline (Zoloft) 50 MG tablet, Take  1 tablet daily, Disp: 90 tablet, Rfl: 1   Past Medical History:   Diagnosis Date    Anxiety     Heart murmur     Hyperlipidemia     Hypertension     Hypothyroidism     Prostate cancer      Allergies   Allergen Reactions    Venlafaxine GI Intolerance               Result Review :     Common labs          7/5/2023    13:43 8/28/2023    09:33 12/28/2023    09:20   Common Labs   Glucose 124  94  104    BUN 22  16  15    Creatinine 1.29  1.04  0.92    Sodium 141  140  139    Potassium 4.2  4.8  4.2    Chloride 101  100  104    Calcium 10.7  9.7  9.3    Albumin 4.8  4.7  4.7    Total  Bilirubin 0.5  0.2  <0.2    Alkaline Phosphatase 69  67  94    AST (SGOT) 28  31  25    ALT (SGPT) 25  32  29    Total Cholesterol 173  182  141    Triglycerides 158  225  121    HDL Cholesterol 45  43  51    LDL Cholesterol  100  101  68         XR Hip With or Without Pelvis 2 - 3 View Left    Result Date: 9/13/2023    1. No acute bony abnormality or significant degenerative change of the hips.      YANETH WHITEHEAD MD       Electronically Signed and Approved By: YANETH WHITEHEAD MD on 9/13/2023 at 11:57             XR Hip With or Without Pelvis 2 - 3 View Right    Result Date: 9/13/2023    1. No acute bony abnormality or significant degenerative change of the right hip.      YANETH WHITEHEAD MD       Electronically Signed and Approved By: YANETH WHITEHEAD MD on 9/13/2023 at 11:56                      Social History     Tobacco Use   Smoking Status Every Day    Packs/day: 0.50    Years: 55.00    Additional pack years: 0.00    Total pack years: 27.50    Types: Cigarettes    Start date: 1968   Smokeless Tobacco Former    Types: Chew    Quit date: 1985   Tobacco Comments    Used to smoke 1 ppd            Assessment and Plan    Diagnoses and all orders for this visit:    1. Hyperglycemia (Primary)  -     Basic metabolic panel; Future  -     Hemoglobin A1c; Future    2. History of underactive thyroid  -     TSH Rfx On Abnormal To Free T4; Future    3. Tremor of both hands  Assessment & Plan:  Neurological exam is within normal limits.  Gait is normal.  Could consider imaging of the head or referral to neurology especially if you are having headaches, unilateral weakness or numbness, shuffling gait, dizziness, or any other neurological concerns.  Handout provided on types of tremor.  Appears to have an essential tremor.  Is not interfering with his daily functioning.  Defers necessity of medication at this time.  States he will observe.  Follow-up if he has any of the symptoms listed above is further evaluation would be  necessary.          Follow Up    No follow-ups on file.  Patient was given instructions and counseling regarding his condition or for health maintenance advice. Please see specific information pulled into the AVS if appropriate.

## 2024-03-27 ENCOUNTER — OFFICE VISIT (OUTPATIENT)
Dept: FAMILY MEDICINE CLINIC | Age: 74
End: 2024-03-27
Payer: MEDICARE

## 2024-03-27 ENCOUNTER — HOSPITAL ENCOUNTER (OUTPATIENT)
Dept: GENERAL RADIOLOGY | Facility: HOSPITAL | Age: 74
Discharge: HOME | End: 2024-03-27
Payer: MEDICARE

## 2024-03-27 ENCOUNTER — LAB (OUTPATIENT)
Dept: LAB | Facility: HOSPITAL | Age: 74
End: 2024-03-27
Payer: MEDICARE

## 2024-03-27 VITALS
HEART RATE: 58 BPM | HEIGHT: 68 IN | OXYGEN SATURATION: 96 % | SYSTOLIC BLOOD PRESSURE: 128 MMHG | WEIGHT: 198 LBS | DIASTOLIC BLOOD PRESSURE: 80 MMHG | BODY MASS INDEX: 30.01 KG/M2

## 2024-03-27 DIAGNOSIS — I10 PRIMARY HYPERTENSION: Primary | ICD-10-CM

## 2024-03-27 DIAGNOSIS — M25.552 BILATERAL HIP PAIN: ICD-10-CM

## 2024-03-27 DIAGNOSIS — M25.551 BILATERAL HIP PAIN: ICD-10-CM

## 2024-03-27 DIAGNOSIS — R73.9 HYPERGLYCEMIA: ICD-10-CM

## 2024-03-27 DIAGNOSIS — M1A.9XX0 CHRONIC GOUT WITHOUT TOPHUS, UNSPECIFIED CAUSE, UNSPECIFIED SITE: ICD-10-CM

## 2024-03-27 DIAGNOSIS — F41.9 ANXIETY: ICD-10-CM

## 2024-03-27 DIAGNOSIS — Z86.39 HISTORY OF UNDERACTIVE THYROID: ICD-10-CM

## 2024-03-27 DIAGNOSIS — G47.00 INSOMNIA, UNSPECIFIED TYPE: ICD-10-CM

## 2024-03-27 DIAGNOSIS — E78.2 MIXED HYPERLIPIDEMIA: ICD-10-CM

## 2024-03-27 DIAGNOSIS — M25.512 ACUTE PAIN OF LEFT SHOULDER: ICD-10-CM

## 2024-03-27 LAB
ANION GAP SERPL CALCULATED.3IONS-SCNC: 9.4 MMOL/L (ref 5–15)
BUN SERPL-MCNC: 18 MG/DL (ref 8–23)
BUN/CREAT SERPL: 15.4 (ref 7–25)
CALCIUM SPEC-SCNC: 9.3 MG/DL (ref 8.6–10.5)
CHLORIDE SERPL-SCNC: 103 MMOL/L (ref 98–107)
CO2 SERPL-SCNC: 27.6 MMOL/L (ref 22–29)
CREAT SERPL-MCNC: 1.17 MG/DL (ref 0.76–1.27)
EGFRCR SERPLBLD CKD-EPI 2021: 65.8 ML/MIN/1.73
GLUCOSE SERPL-MCNC: 97 MG/DL (ref 65–99)
HBA1C MFR BLD: 5.8 % (ref 4.8–5.6)
POTASSIUM SERPL-SCNC: 4.6 MMOL/L (ref 3.5–5.2)
SODIUM SERPL-SCNC: 140 MMOL/L (ref 136–145)
TSH SERPL DL<=0.05 MIU/L-ACNC: 2.42 UIU/ML (ref 0.27–4.2)
URATE SERPL-MCNC: 4.5 MG/DL (ref 3.4–7)

## 2024-03-27 PROCEDURE — 99214 OFFICE O/P EST MOD 30 MIN: CPT | Performed by: NURSE PRACTITIONER

## 2024-03-27 PROCEDURE — 84443 ASSAY THYROID STIM HORMONE: CPT

## 2024-03-27 PROCEDURE — 84550 ASSAY OF BLOOD/URIC ACID: CPT

## 2024-03-27 PROCEDURE — 1159F MED LIST DOCD IN RCRD: CPT | Performed by: NURSE PRACTITIONER

## 2024-03-27 PROCEDURE — 3074F SYST BP LT 130 MM HG: CPT | Performed by: NURSE PRACTITIONER

## 2024-03-27 PROCEDURE — 36415 COLL VENOUS BLD VENIPUNCTURE: CPT

## 2024-03-27 PROCEDURE — 80048 BASIC METABOLIC PNL TOTAL CA: CPT

## 2024-03-27 PROCEDURE — 83036 HEMOGLOBIN GLYCOSYLATED A1C: CPT

## 2024-03-27 PROCEDURE — 73030 X-RAY EXAM OF SHOULDER: CPT

## 2024-03-27 PROCEDURE — 1160F RVW MEDS BY RX/DR IN RCRD: CPT | Performed by: NURSE PRACTITIONER

## 2024-03-27 PROCEDURE — 3079F DIAST BP 80-89 MM HG: CPT | Performed by: NURSE PRACTITIONER

## 2024-03-27 RX ORDER — LISINOPRIL AND HYDROCHLOROTHIAZIDE 20; 12.5 MG/1; MG/1
1 TABLET ORAL DAILY
Qty: 90 TABLET | Refills: 1 | Status: SHIPPED | OUTPATIENT
Start: 2024-03-27

## 2024-03-27 RX ORDER — PREDNISOLONE ACETATE 10 MG/ML
SUSPENSION/ DROPS OPHTHALMIC
COMMUNITY
Start: 2024-03-04

## 2024-03-27 RX ORDER — ALLOPURINOL 100 MG/1
100 TABLET ORAL DAILY
Qty: 90 TABLET | Refills: 1 | Status: SHIPPED | OUTPATIENT
Start: 2024-03-27

## 2024-03-27 RX ORDER — HYDROXYZINE HYDROCHLORIDE 25 MG/1
50 TABLET, FILM COATED ORAL NIGHTLY PRN
Qty: 60 TABLET | Refills: 5 | Status: SHIPPED | OUTPATIENT
Start: 2024-03-27

## 2024-03-27 NOTE — PROGRESS NOTES
"Chief Complaint  Anxiety (6 month follow up ), Hypertension, Hyperlipidemia, Gout, and Shoulder Pain (Left should pain that radiates into neck for 3 weeks patient states he thinks \"I might have fallen\" )    Subjective          Telly Meneses presents to [facility]     Patient is a 73-year-old male who is here today to follow-up regarding anxiety.  Symptoms have improved on sertraline 50 mg daily but he feels like he could benefit from a higher dose of sertraline.  He denies depression.    Regarding insomnia hydroxyzine 50 mg at night as needed is minimally effective.  Patient drinks coffee and regular Mountain Dew all day and is attempting to drink more water.  He states this caffeine consumption is improved from when he used to drink energy drinks.  He does not nap much during the day.  He worked night shift for many years.    Left shoulder pain intermittently for 3 weeks.  He denies any fall but he was recently swinging on a bar.  He thinks he may have pulled something in his shoulder.  He has been taking Tylenol arthritis for relief which is helping to a mild degree.  Pain does not interrupt his sleep.  He also persists with chronic hip pain with normal x-rays in September.  Patient defers referral to physical therapy.  He went to physical therapy once previously due to back pain and it was not effective.  He does take allopurinol 100 mg daily for history of gout.  He may have had a recent gout flare with one of his toes but not current.  He is agreeable to having his uric acid level checked to see if current dose of allopurinol was sufficient.  I remind patient that sodas could be triggering gout.    Hypertension he takes lisinopril 20 mg with hydrochlorothiazide 12.5 mg once daily.  He reports blood pressures under good control.  Denies side effects and requests refills.    Cardiology prescribes his atorvastatin for hyperlipidemia.     Objective   Vital Signs:   Vitals:    " "03/27/24 0807   BP: 128/80   BP Location: Left arm   Patient Position: Sitting   Cuff Size: Large Adult   Pulse: 58   SpO2: 96%   Weight: 89.8 kg (198 lb)   Height: 172.7 cm (68\")       Wt Readings from Last 3 Encounters:   03/27/24 89.8 kg (198 lb)   01/03/24 88.4 kg (194 lb 12.8 oz)   10/25/23 83 kg (183 lb)      BP Readings from Last 3 Encounters:   03/27/24 128/80   01/03/24 116/75   10/25/23 112/67       Body mass index is 30.11 kg/m².             Physical Exam  Vitals reviewed.   Constitutional:       General: He is not in acute distress.     Appearance: Normal appearance. He is well-developed.   Neck:      Thyroid: No thyromegaly.      Vascular: No carotid bruit.   Cardiovascular:      Rate and Rhythm: Normal rate and regular rhythm.      Heart sounds: Normal heart sounds.   Pulmonary:      Effort: Pulmonary effort is normal.      Breath sounds: Normal breath sounds.   Musculoskeletal:      Right lower leg: No edema.      Left lower leg: No edema.   Skin:     General: Skin is warm and dry.   Neurological:      General: No focal deficit present.      Mental Status: He is alert.   Psychiatric:         Attention and Perception: Attention normal.         Mood and Affect: Mood and affect normal.         Behavior: Behavior normal.           Current Outpatient Medications:     albuterol sulfate  (90 Base) MCG/ACT inhaler, Inhale 2 puffs Every 4 (Four) Hours As Needed for Wheezing., Disp: 18 g, Rfl: 0    allopurinol (ZYLOPRIM) 100 MG tablet, Take 1 tablet by mouth Daily., Disp: 90 tablet, Rfl: 1    atorvastatin (LIPITOR) 40 MG tablet, TAKE 1 TABLET BY MOUTH ONCE DAILY NIGHTLY, Disp: 90 tablet, Rfl: 0    fluticasone (Flonase) 50 MCG/ACT nasal spray, 2 sprays into the nostril(s) as directed by provider Daily., Disp: 11.1 mL, Rfl: 0    hydrOXYzine (ATARAX) 25 MG tablet, Take 2 tablets by mouth At Night As Needed (insomnia). for anxiety, Disp: 60 tablet, Rfl: 5    lisinopril-hydrochlorothiazide " (PRINZIDE,ZESTORETIC) 20-12.5 MG per tablet, Take 1 tablet by mouth Daily., Disp: 90 tablet, Rfl: 1    nitroglycerin (NITROSTAT) 0.4 MG SL tablet, Place 1 tablet under the tongue Every 5 (Five) Minutes As Needed for Chest Pain. Take no more than 3 doses in 15 minutes., Disp: 100 tablet, Rfl: 0    prednisoLONE acetate (PRED FORTE) 1 % ophthalmic suspension, INSTILL 1 DROP INTO LEFT EYE FOUR TIMES DAILY FOR 7 DAYS, THEN 1 DROP THREE TIMES DAILY FOR 7 DAYS, THEN 1 DROP TWO TIMES DAILY FOR 7 DAYS, THEN 1 DROP ONCE DAILY FOR 7 DAYS THEN STOP, Disp: , Rfl:     sertraline (Zoloft) 50 MG tablet, Take  one and one half  tablet once daily, Disp: 135 tablet, Rfl: 1   Past Medical History:   Diagnosis Date    Anxiety     Heart murmur     Hyperlipidemia     Hypertension     Hypothyroidism     Prostate cancer      Allergies   Allergen Reactions    Venlafaxine GI Intolerance               Result Review :     Common labs          7/5/2023    13:43 8/28/2023    09:33 12/28/2023    09:20   Common Labs   Glucose 124  94  104    BUN 22  16  15    Creatinine 1.29  1.04  0.92    Sodium 141  140  139    Potassium 4.2  4.8  4.2    Chloride 101  100  104    Calcium 10.7  9.7  9.3    Albumin 4.8  4.7  4.7    Total Bilirubin 0.5  0.2  <0.2    Alkaline Phosphatase 69  67  94    AST (SGOT) 28  31  25    ALT (SGPT) 25  32  29    Total Cholesterol 173  182  141    Triglycerides 158  225  121    HDL Cholesterol 45  43  51    LDL Cholesterol  100  101  68         No Images in the past 120 days found..           Social History     Tobacco Use   Smoking Status Every Day    Current packs/day: 0.50    Average packs/day: 0.5 packs/day for 56.2 years (28.1 ttl pk-yrs)    Types: Cigarettes    Start date: 1968   Smokeless Tobacco Former    Types: Chew    Quit date: 1985   Tobacco Comments    Used to smoke 1 ppd            Assessment and Plan    Diagnoses and all orders for this visit:    1. Primary hypertension (Primary)  -      lisinopril-hydrochlorothiazide (PRINZIDE,ZESTORETIC) 20-12.5 MG per tablet; Take 1 tablet by mouth Daily.  Dispense: 90 tablet; Refill: 1    2. Mixed hyperlipidemia  Assessment & Plan:  Get labs collected as ordered by cardiology      3. Insomnia, unspecified type  Assessment & Plan:  He gets entirely too much caffeine intake.  We could try other medicines but I think it is more important to diminish caffeine intake and see how he does.    Orders:  -     hydrOXYzine (ATARAX) 25 MG tablet; Take 2 tablets by mouth At Night As Needed (insomnia). for anxiety  Dispense: 60 tablet; Refill: 5    4. Chronic gout without tophus, unspecified cause, unspecified site  -     Uric acid; Future  -     allopurinol (ZYLOPRIM) 100 MG tablet; Take 1 tablet by mouth Daily.  Dispense: 90 tablet; Refill: 1    5. Anxiety  -     hydrOXYzine (ATARAX) 25 MG tablet; Take 2 tablets by mouth At Night As Needed (insomnia). for anxiety  Dispense: 60 tablet; Refill: 5  -     sertraline (Zoloft) 50 MG tablet; Take  one and one half  tablet once daily  Dispense: 135 tablet; Refill: 1    6. Acute pain of left shoulder  -     XR Shoulder 2+ View Left; Future    7. Bilateral hip pain  Assessment & Plan:  Previous x-rays normal.  Does not feel as if it is due to his low back.  Denies sciatica.  Defers physical therapy.  We are x-raying his left shoulder today.  If symptoms are not improving with the hips in the shoulder I would then next recommend he see orthopedics.  Patient will consider.          Follow Up    No follow-ups on file.  Patient was given instructions and counseling regarding his condition or for health maintenance advice. Please see specific information pulled into the AVS if appropriate.

## 2024-03-27 NOTE — ASSESSMENT & PLAN NOTE
Previous x-rays normal.  Does not feel as if it is due to his low back.  Denies sciatica.  Defers physical therapy.  We are x-raying his left shoulder today.  If symptoms are not improving with the hips in the shoulder I would then next recommend he see orthopedics.  Patient will consider.

## 2024-03-27 NOTE — PROGRESS NOTES
Please advise that left shoulder with arthritis and no fractures.  Consider referral to orthopedics if not improving.

## 2024-03-27 NOTE — ASSESSMENT & PLAN NOTE
He gets entirely too much caffeine intake.  We could try other medicines but I think it is more important to diminish caffeine intake and see how he does.

## 2024-03-28 RX ORDER — ATORVASTATIN CALCIUM 40 MG/1
40 TABLET, FILM COATED ORAL
Qty: 90 TABLET | Refills: 0 | Status: SHIPPED | OUTPATIENT
Start: 2024-03-28

## 2024-04-01 NOTE — PROGRESS NOTES
Blood sugar, kidney function, and thyroid function are normal.  Uric acid level is normal.  Your hemoglobin A1c indicates you are mildly prediabetic.  Decrease intake of sugar.

## 2024-06-12 RX ORDER — ATORVASTATIN CALCIUM 40 MG/1
40 TABLET, FILM COATED ORAL
Qty: 90 TABLET | Refills: 0 | Status: SHIPPED | OUTPATIENT
Start: 2024-06-12

## 2024-07-17 ENCOUNTER — OFFICE VISIT (OUTPATIENT)
Dept: CARDIOLOGY | Facility: CLINIC | Age: 74
End: 2024-07-17
Payer: MEDICARE

## 2024-07-17 VITALS
SYSTOLIC BLOOD PRESSURE: 100 MMHG | WEIGHT: 193 LBS | HEIGHT: 68 IN | DIASTOLIC BLOOD PRESSURE: 78 MMHG | BODY MASS INDEX: 29.25 KG/M2 | HEART RATE: 117 BPM

## 2024-07-17 DIAGNOSIS — E78.2 MIXED HYPERLIPIDEMIA: ICD-10-CM

## 2024-07-17 DIAGNOSIS — I25.10 CORONARY ARTERY DISEASE INVOLVING NATIVE CORONARY ARTERY OF NATIVE HEART WITHOUT ANGINA PECTORIS: Primary | ICD-10-CM

## 2024-07-17 DIAGNOSIS — I10 ESSENTIAL HYPERTENSION: ICD-10-CM

## 2024-07-17 PROCEDURE — 3074F SYST BP LT 130 MM HG: CPT | Performed by: INTERNAL MEDICINE

## 2024-07-17 PROCEDURE — 1159F MED LIST DOCD IN RCRD: CPT | Performed by: INTERNAL MEDICINE

## 2024-07-17 PROCEDURE — 3078F DIAST BP <80 MM HG: CPT | Performed by: INTERNAL MEDICINE

## 2024-07-17 PROCEDURE — 1160F RVW MEDS BY RX/DR IN RCRD: CPT | Performed by: INTERNAL MEDICINE

## 2024-07-17 PROCEDURE — 99214 OFFICE O/P EST MOD 30 MIN: CPT | Performed by: INTERNAL MEDICINE

## 2024-07-17 RX ORDER — ASPIRIN 81 MG/1
81 TABLET ORAL DAILY
COMMUNITY

## 2024-07-17 NOTE — ASSESSMENT & PLAN NOTE
Labs done in December showed LDL of 68, currently at goal and improved since medications were changed to atorvastatin.  Will continue atorvastatin 40 mg nightly.

## 2024-07-17 NOTE — PROGRESS NOTES
CARDIOLOGY FOLLOW-UP PROGRESS NOTE        Chief Complaint  Follow-up, Coronary Artery Disease, and Hyperlipidemia    Subjective            Telly Meneses presents to [facility]  History of Present Illness      Mr. Ward is here for routine 9-month follow-up visit for coronary arteries and hyperlipidemia.  He is very active at baseline.  He had 1 episode of chest pain in the past 6 months while working in the yard for a while.  Symptoms resolved with taking rest.  Denies palpitations or dizziness.  During last visit, simvastatin was changed to atorvastatin he is tolerating the medication well.      Past History:    Multivessel coronary artery disease, previous extensive angioplasty (no reports available)    Mixed hyperlipidemia  Essential hypertension    Medical History:  Past Medical History:   Diagnosis Date    Anxiety     Heart murmur     Hyperlipidemia     Hypertension     Hypothyroidism     Prostate cancer        Surgical History: has a past surgical history that includes Cardiac valve replacement and Prostate surgery.     Family History: family history includes Alzheimer's disease in his mother; Cancer in his sister; Diabetes in his daughter; Macular degeneration in his mother.     Social History: reports that he has been smoking cigarettes. He started smoking about 56 years ago. He has a 28.3 pack-year smoking history. He quit smokeless tobacco use about 39 years ago.  His smokeless tobacco use included chew. He reports current alcohol use of about 7.0 standard drinks of alcohol per week. He reports that he does not use drugs.    Allergies: Venlafaxine    Current Outpatient Medications on File Prior to Visit   Medication Sig    albuterol sulfate  (90 Base) MCG/ACT inhaler Inhale 2 puffs Every 4 (Four) Hours As Needed for Wheezing.    allopurinol (ZYLOPRIM) 100 MG tablet Take 1 tablet by mouth Daily.    atorvastatin (LIPITOR) 40 MG tablet TAKE 1 TABLET BY MOUTH ONCE  "DAILY NIGHTLY    fluticasone (Flonase) 50 MCG/ACT nasal spray 2 sprays into the nostril(s) as directed by provider Daily.    hydrOXYzine (ATARAX) 25 MG tablet Take 2 tablets by mouth At Night As Needed (insomnia). for anxiety    lisinopril-hydrochlorothiazide (PRINZIDE,ZESTORETIC) 20-12.5 MG per tablet Take 1 tablet by mouth Daily.    nitroglycerin (NITROSTAT) 0.4 MG SL tablet Place 1 tablet under the tongue Every 5 (Five) Minutes As Needed for Chest Pain. Take no more than 3 doses in 15 minutes.    prednisoLONE acetate (PRED FORTE) 1 % ophthalmic suspension INSTILL 1 DROP INTO LEFT EYE FOUR TIMES DAILY FOR 7 DAYS, THEN 1 DROP THREE TIMES DAILY FOR 7 DAYS, THEN 1 DROP TWO TIMES DAILY FOR 7 DAYS, THEN 1 DROP ONCE DAILY FOR 7 DAYS THEN STOP    sertraline (Zoloft) 50 MG tablet Take  one and one half  tablet once daily    aspirin 81 MG EC tablet Take 1 tablet by mouth Daily.     No current facility-administered medications on file prior to visit.          Review of Systems   Respiratory:  Negative for cough, shortness of breath and wheezing.    Cardiovascular:  Negative for chest pain, palpitations and leg swelling.   Gastrointestinal:  Negative for nausea and vomiting.   Neurological:  Negative for dizziness and syncope.        Objective     /78   Pulse 117   Ht 172.7 cm (68\")   Wt 87.5 kg (193 lb)   BMI 29.35 kg/m²       Physical Exam    General : Alert, awake, no acute distress  Neck : Supple, no carotid bruit, no jugular venous distention  CVS : Regular rate and rhythm, no murmur, rubs or gallops  Lungs: Clear to auscultation bilaterally, no crackles or rhonchi  Abdomen: Soft, nontender, bowel sounds heard in all 4 quadrants  Extremities: Warm, well-perfused, no pedal edema    Result Review :     The following data was reviewed by: Samy Heredia MD on 07/17/2024:    CMP          8/28/2023    09:33 12/28/2023    09:20 3/27/2024    08:53   CMP   Glucose 94  104  97    BUN 16  15  18    Creatinine 1.04  " 0.92  1.17    EGFR 75.8  87.8  65.8    Sodium 140  139  140    Potassium 4.8  4.2  4.6    Chloride 100  104  103    Calcium 9.7  9.3  9.3    Total Protein 7.5  7.1     Albumin 4.7  4.7     Globulin 2.8  2.4     Total Bilirubin 0.2  <0.2     Alkaline Phosphatase 67  94     AST (SGOT) 31  25     ALT (SGPT) 32  29     Albumin/Globulin Ratio 1.7  2.0     BUN/Creatinine Ratio 15.4  16.3  15.4    Anion Gap 10.6  11.0  9.4        TSH          3/27/2024    08:53   TSH   TSH 2.420      Lipid Panel          8/28/2023    09:33 12/28/2023    09:20   Lipid Panel   Total Cholesterol 182  141    Triglycerides 225  121    HDL Cholesterol 43  51    VLDL Cholesterol 38  22    LDL Cholesterol  101  68    LDL/HDL Ratio 2.19  1.29           Data reviewed: Cardiology studies        Results for orders placed in visit on 10/25/23    Adult Transthoracic Echo Complete W/ Cont if Necessary Per Protocol    Interpretation Summary    Left ventricular systolic function is normal. Left ventricular ejection fraction appears to be 61 - 65%.    Left ventricular diastolic function is consistent with (grade I) impaired relaxation.    Trace aortic insufficiency noted.    Estimated right ventricular systolic pressure from tricuspid regurgitation is normal (<35 mmHg).                   Assessment and Plan        Diagnoses and all orders for this visit:    1. Coronary artery disease involving native coronary artery of native heart without angina pectoris (Primary)  Assessment & Plan:  He is currently stable with no anginal-like symptoms.  1 episode of chest pain several months back.  Echocardiogram showed preserved LV function.  At this time, we will continue aspirin, statin.  For any recurrent symptoms, a stress test can be considered.      2. Mixed hyperlipidemia  Assessment & Plan:  Labs done in December showed LDL of 68, currently at goal and improved since medications were changed to atorvastatin.  Will continue atorvastatin 40 mg nightly.      3.  Essential hypertension  Assessment & Plan:  Blood pressure well-controlled.  Continue lisinopril/hydrochlorothiazide at the current dose.                Follow Up     Return in about 9 months (around 4/17/2025) for Next scheduled follow up.    Patient was given instructions and counseling regarding his condition or for health maintenance advice. Please see specific information pulled into the AVS if appropriate.

## 2024-07-17 NOTE — ASSESSMENT & PLAN NOTE
He is currently stable with no anginal-like symptoms.  1 episode of chest pain several months back.  Echocardiogram showed preserved LV function.  At this time, we will continue aspirin, statin.  For any recurrent symptoms, a stress test can be considered.

## 2024-07-26 ENCOUNTER — OFFICE VISIT (OUTPATIENT)
Dept: FAMILY MEDICINE CLINIC | Age: 74
End: 2024-07-26
Payer: MEDICARE

## 2024-07-26 VITALS
BODY MASS INDEX: 29.25 KG/M2 | HEIGHT: 68 IN | SYSTOLIC BLOOD PRESSURE: 110 MMHG | OXYGEN SATURATION: 94 % | DIASTOLIC BLOOD PRESSURE: 72 MMHG | TEMPERATURE: 98.3 F | WEIGHT: 193 LBS | HEART RATE: 72 BPM

## 2024-07-26 DIAGNOSIS — R55 NEAR SYNCOPE: Primary | ICD-10-CM

## 2024-07-26 PROCEDURE — 1160F RVW MEDS BY RX/DR IN RCRD: CPT | Performed by: NURSE PRACTITIONER

## 2024-07-26 PROCEDURE — 93000 ELECTROCARDIOGRAM COMPLETE: CPT | Performed by: NURSE PRACTITIONER

## 2024-07-26 PROCEDURE — 99214 OFFICE O/P EST MOD 30 MIN: CPT | Performed by: NURSE PRACTITIONER

## 2024-07-26 PROCEDURE — 3078F DIAST BP <80 MM HG: CPT | Performed by: NURSE PRACTITIONER

## 2024-07-26 PROCEDURE — 3074F SYST BP LT 130 MM HG: CPT | Performed by: NURSE PRACTITIONER

## 2024-07-26 PROCEDURE — 1159F MED LIST DOCD IN RCRD: CPT | Performed by: NURSE PRACTITIONER

## 2024-07-26 NOTE — ASSESSMENT & PLAN NOTE
Today's EKG is unchanged compared to previous EKG.  Patient does follow cardiology on a regular basis.  All previous leg experience symptoms have resolved.  Use caution when being outdoors in the excessive heat.  Patient defers labs due to symptoms resolved.  Monitor closely and follow-up for any persisting symptoms or concerns.

## 2024-07-26 NOTE — PROGRESS NOTES
"Chief Complaint  Dizziness and cold sweat    Subjective          Telly Meneses presents to [facility]     Patient is a 74-year-old male who is here today due to an episode he experienced 2 weeks ago.  He was outside using the weedeater when he became very hot.  He started feeling dizzy and experience some numbness and tingling of his lower arms bilateral and felt like he had some cold chills for several minutes or more.  He went to sit in the truck with a air conditioning on and drink some water and symptoms returned to normal within the hour.  He did not experience any chest pain or irregular heartbeat or shortness of breath with the episode.  He denies any symptoms since that time.     Objective   Vital Signs:   Vitals:    07/26/24 1253   BP: 110/72   BP Location: Left arm   Patient Position: Sitting   Pulse: 72   Temp: 98.3 °F (36.8 °C)   TempSrc: Oral   SpO2: 94%   Weight: 87.5 kg (193 lb)   Height: 172.7 cm (68\")       Wt Readings from Last 3 Encounters:   07/26/24 87.5 kg (193 lb)   07/17/24 87.5 kg (193 lb)   03/27/24 89.8 kg (198 lb)      BP Readings from Last 3 Encounters:   07/26/24 110/72   07/17/24 100/78   03/27/24 128/80       Body mass index is 29.35 kg/m².             Physical Exam  Vitals reviewed.   Constitutional:       General: He is not in acute distress.     Appearance: Normal appearance. He is well-developed.   HENT:      Right Ear: Tympanic membrane normal.      Left Ear: Tympanic membrane normal.      Mouth/Throat:      Pharynx: No oropharyngeal exudate or posterior oropharyngeal erythema.   Cardiovascular:      Rate and Rhythm: Normal rate and regular rhythm.      Heart sounds: Normal heart sounds.   Pulmonary:      Effort: Pulmonary effort is normal.      Breath sounds: Normal breath sounds.   Musculoskeletal:      Right lower leg: No edema.      Left lower leg: No edema.   Skin:     General: Skin is warm and dry.   Neurological:      General: No " focal deficit present.      Mental Status: He is alert.   Psychiatric:         Attention and Perception: Attention normal.         Mood and Affect: Mood and affect normal.         Behavior: Behavior normal.           Current Outpatient Medications:     albuterol sulfate  (90 Base) MCG/ACT inhaler, Inhale 2 puffs Every 4 (Four) Hours As Needed for Wheezing., Disp: 18 g, Rfl: 0    allopurinol (ZYLOPRIM) 100 MG tablet, Take 1 tablet by mouth Daily., Disp: 90 tablet, Rfl: 1    aspirin 81 MG EC tablet, Take 1 tablet by mouth Daily., Disp: , Rfl:     atorvastatin (LIPITOR) 40 MG tablet, TAKE 1 TABLET BY MOUTH ONCE DAILY NIGHTLY, Disp: 90 tablet, Rfl: 0    fluticasone (Flonase) 50 MCG/ACT nasal spray, 2 sprays into the nostril(s) as directed by provider Daily., Disp: 11.1 mL, Rfl: 0    hydrOXYzine (ATARAX) 25 MG tablet, Take 2 tablets by mouth At Night As Needed (insomnia). for anxiety, Disp: 60 tablet, Rfl: 5    lisinopril-hydrochlorothiazide (PRINZIDE,ZESTORETIC) 20-12.5 MG per tablet, Take 1 tablet by mouth Daily., Disp: 90 tablet, Rfl: 1    nitroglycerin (NITROSTAT) 0.4 MG SL tablet, Place 1 tablet under the tongue Every 5 (Five) Minutes As Needed for Chest Pain. Take no more than 3 doses in 15 minutes., Disp: 100 tablet, Rfl: 0    sertraline (Zoloft) 50 MG tablet, Take  one and one half  tablet once daily, Disp: 135 tablet, Rfl: 1   Past Medical History:   Diagnosis Date    Anxiety     Heart murmur     Hyperlipidemia     Hypertension     Hypothyroidism     Prostate cancer      Allergies   Allergen Reactions    Venlafaxine GI Intolerance               Result Review :     Common labs          8/28/2023    09:33 12/28/2023    09:20 3/27/2024    08:53   Common Labs   Glucose 94  104  97    BUN 16  15  18    Creatinine 1.04  0.92  1.17    Sodium 140  139  140    Potassium 4.8  4.2  4.6    Chloride 100  104  103    Calcium 9.7  9.3  9.3    Albumin 4.7  4.7     Total Bilirubin 0.2  <0.2     Alkaline Phosphatase 67  94      AST (SGOT) 31  25     ALT (SGPT) 32  29     Total Cholesterol 182  141     Triglycerides 225  121     HDL Cholesterol 43  51     LDL Cholesterol  101  68     Hemoglobin A1C   5.80    Uric Acid   4.5         No Images in the past 120 days found..         ECG 12 Lead    Date/Time: 7/26/2024 1:41 PM  Performed by: Kathrin Malagon APRN    Authorized by: Kathrin Malagon APRN  Comparison: compared with previous ECG from 6/14/2023  Similar to previous ECG  Rhythm: sinus rhythm  Rate: normal  BPM: 61  Conduction: left anterior fascicular block  ST Segments: ST segments normal  T Waves: T waves normal  QRS axis: normal    Clinical impression: abnormal EKG         Social History     Tobacco Use   Smoking Status Every Day    Current packs/day: 0.50    Average packs/day: 0.5 packs/day for 56.6 years (28.3 ttl pk-yrs)    Types: Cigarettes    Start date: 1968   Smokeless Tobacco Former    Types: Chew    Quit date: 1985   Tobacco Comments    Used to smoke 1 ppd            Assessment and Plan    Diagnoses and all orders for this visit:    1. Near syncope (Primary)  Assessment & Plan:  Today's EKG is unchanged compared to previous EKG.  Patient does follow cardiology on a regular basis.  All previous leg experience symptoms have resolved.  Use caution when being outdoors in the excessive heat.  Patient defers labs due to symptoms resolved.  Monitor closely and follow-up for any persisting symptoms or concerns.    Orders:  -     ECG 12 Lead        Follow Up    No follow-ups on file.  Patient was given instructions and counseling regarding his condition or for health maintenance advice. Please see specific information pulled into the AVS if appropriate.

## 2024-09-09 RX ORDER — ATORVASTATIN CALCIUM 40 MG/1
40 TABLET, FILM COATED ORAL
Qty: 90 TABLET | Refills: 3 | Status: SHIPPED | OUTPATIENT
Start: 2024-09-09

## 2024-09-18 ENCOUNTER — OFFICE VISIT (OUTPATIENT)
Dept: FAMILY MEDICINE CLINIC | Age: 74
End: 2024-09-18
Payer: MEDICARE

## 2024-09-18 VITALS
HEIGHT: 68 IN | WEIGHT: 196.4 LBS | BODY MASS INDEX: 29.77 KG/M2 | DIASTOLIC BLOOD PRESSURE: 88 MMHG | HEART RATE: 68 BPM | SYSTOLIC BLOOD PRESSURE: 132 MMHG | TEMPERATURE: 97.6 F

## 2024-09-18 DIAGNOSIS — M1A.9XX0 CHRONIC GOUT WITHOUT TOPHUS, UNSPECIFIED CAUSE, UNSPECIFIED SITE: ICD-10-CM

## 2024-09-18 DIAGNOSIS — M25.551 BILATERAL HIP PAIN: ICD-10-CM

## 2024-09-18 DIAGNOSIS — R91.8 PULMONARY NODULES: ICD-10-CM

## 2024-09-18 DIAGNOSIS — Z00.00 MEDICARE ANNUAL WELLNESS VISIT, SUBSEQUENT: ICD-10-CM

## 2024-09-18 DIAGNOSIS — I10 PRIMARY HYPERTENSION: ICD-10-CM

## 2024-09-18 DIAGNOSIS — F17.210 NICOTINE DEPENDENCE, CIGARETTES, UNCOMPLICATED: ICD-10-CM

## 2024-09-18 DIAGNOSIS — R73.9 HYPERGLYCEMIA: ICD-10-CM

## 2024-09-18 DIAGNOSIS — M25.552 BILATERAL HIP PAIN: ICD-10-CM

## 2024-09-18 DIAGNOSIS — Z85.46 HISTORY OF PROSTATE CANCER: Primary | ICD-10-CM

## 2024-09-18 DIAGNOSIS — E78.2 MIXED HYPERLIPIDEMIA: ICD-10-CM

## 2024-09-18 DIAGNOSIS — F41.9 ANXIETY: ICD-10-CM

## 2024-09-18 DIAGNOSIS — G47.00 INSOMNIA, UNSPECIFIED TYPE: ICD-10-CM

## 2024-09-18 DIAGNOSIS — Z13.9 SPECIAL SCREENING: ICD-10-CM

## 2024-09-18 DIAGNOSIS — I10 ESSENTIAL HYPERTENSION: ICD-10-CM

## 2024-09-18 DIAGNOSIS — Z12.2 SCREENING FOR LUNG CANCER: ICD-10-CM

## 2024-09-18 PROBLEM — R55 NEAR SYNCOPE: Status: RESOLVED | Noted: 2024-07-26 | Resolved: 2024-09-18

## 2024-09-18 PROCEDURE — 1160F RVW MEDS BY RX/DR IN RCRD: CPT | Performed by: NURSE PRACTITIONER

## 2024-09-18 PROCEDURE — G0439 PPPS, SUBSEQ VISIT: HCPCS | Performed by: NURSE PRACTITIONER

## 2024-09-18 PROCEDURE — 1170F FXNL STATUS ASSESSED: CPT | Performed by: NURSE PRACTITIONER

## 2024-09-18 PROCEDURE — 3079F DIAST BP 80-89 MM HG: CPT | Performed by: NURSE PRACTITIONER

## 2024-09-18 PROCEDURE — 3075F SYST BP GE 130 - 139MM HG: CPT | Performed by: NURSE PRACTITIONER

## 2024-09-18 PROCEDURE — 1159F MED LIST DOCD IN RCRD: CPT | Performed by: NURSE PRACTITIONER

## 2024-09-18 RX ORDER — LISINOPRIL AND HYDROCHLOROTHIAZIDE 12.5; 2 MG/1; MG/1
1 TABLET ORAL DAILY
Qty: 90 TABLET | Refills: 1 | Status: SHIPPED | OUTPATIENT
Start: 2024-09-18

## 2024-09-18 RX ORDER — ALLOPURINOL 100 MG/1
100 TABLET ORAL DAILY
Qty: 90 TABLET | Refills: 1 | Status: SHIPPED | OUTPATIENT
Start: 2024-09-18

## 2024-09-18 RX ORDER — HYDROXYZINE HYDROCHLORIDE 25 MG/1
50 TABLET, FILM COATED ORAL NIGHTLY PRN
Qty: 180 TABLET | Refills: 1 | Status: SHIPPED | OUTPATIENT
Start: 2024-09-18

## 2024-09-24 ENCOUNTER — OFFICE VISIT (OUTPATIENT)
Dept: FAMILY MEDICINE CLINIC | Age: 74
End: 2024-09-24
Payer: MEDICARE

## 2024-09-24 VITALS
BODY MASS INDEX: 29.67 KG/M2 | HEART RATE: 68 BPM | HEIGHT: 68 IN | SYSTOLIC BLOOD PRESSURE: 111 MMHG | OXYGEN SATURATION: 96 % | DIASTOLIC BLOOD PRESSURE: 68 MMHG | WEIGHT: 195.8 LBS

## 2024-09-24 DIAGNOSIS — R25.1 TREMOR: ICD-10-CM

## 2024-09-24 DIAGNOSIS — H91.93 DECREASED HEARING OF BOTH EARS: ICD-10-CM

## 2024-09-24 DIAGNOSIS — M54.50 CHRONIC BILATERAL LOW BACK PAIN WITHOUT SCIATICA: ICD-10-CM

## 2024-09-24 DIAGNOSIS — M62.838 MUSCLE SPASM: Primary | ICD-10-CM

## 2024-09-24 DIAGNOSIS — G89.29 CHRONIC BILATERAL LOW BACK PAIN WITHOUT SCIATICA: ICD-10-CM

## 2024-09-24 PROCEDURE — 1159F MED LIST DOCD IN RCRD: CPT | Performed by: NURSE PRACTITIONER

## 2024-09-24 PROCEDURE — 1160F RVW MEDS BY RX/DR IN RCRD: CPT | Performed by: NURSE PRACTITIONER

## 2024-09-24 PROCEDURE — 3078F DIAST BP <80 MM HG: CPT | Performed by: NURSE PRACTITIONER

## 2024-09-24 PROCEDURE — 99213 OFFICE O/P EST LOW 20 MIN: CPT | Performed by: NURSE PRACTITIONER

## 2024-09-24 PROCEDURE — 3074F SYST BP LT 130 MM HG: CPT | Performed by: NURSE PRACTITIONER

## 2024-09-24 RX ORDER — BACLOFEN 10 MG/1
10 TABLET ORAL 2 TIMES DAILY PRN
Qty: 30 TABLET | Refills: 1 | Status: SHIPPED | OUTPATIENT
Start: 2024-09-24

## 2024-09-25 ENCOUNTER — TELEPHONE (OUTPATIENT)
Dept: FAMILY MEDICINE CLINIC | Age: 74
End: 2024-09-25
Payer: MEDICARE

## 2024-09-25 PROBLEM — H91.93 DECREASED HEARING OF BOTH EARS: Status: ACTIVE | Noted: 2024-09-25

## 2024-09-26 ENCOUNTER — HOSPITAL ENCOUNTER (OUTPATIENT)
Dept: MRI IMAGING | Facility: HOSPITAL | Age: 74
Discharge: HOME | End: 2024-09-26
Admitting: NURSE PRACTITIONER
Payer: MEDICARE

## 2024-09-26 DIAGNOSIS — R25.1 TREMOR: ICD-10-CM

## 2024-09-26 PROCEDURE — 70551 MRI BRAIN STEM W/O DYE: CPT

## 2024-10-23 ENCOUNTER — TELEPHONE (OUTPATIENT)
Dept: FAMILY MEDICINE CLINIC | Age: 74
End: 2024-10-23
Payer: MEDICARE

## 2024-10-24 ENCOUNTER — LAB (OUTPATIENT)
Dept: LAB | Facility: HOSPITAL | Age: 74
End: 2024-10-24
Payer: MEDICARE

## 2024-10-24 DIAGNOSIS — M62.838 MUSCLE SPASM: ICD-10-CM

## 2024-10-24 DIAGNOSIS — I10 ESSENTIAL HYPERTENSION: ICD-10-CM

## 2024-10-24 DIAGNOSIS — R73.9 HYPERGLYCEMIA: ICD-10-CM

## 2024-10-24 DIAGNOSIS — Z85.46 HISTORY OF PROSTATE CANCER: ICD-10-CM

## 2024-10-24 DIAGNOSIS — E78.2 MIXED HYPERLIPIDEMIA: ICD-10-CM

## 2024-10-24 LAB
ALBUMIN SERPL-MCNC: 4.2 G/DL (ref 3.5–5.2)
ALBUMIN/GLOB SERPL: 1.4 G/DL
ALP SERPL-CCNC: 85 U/L (ref 39–117)
ALT SERPL W P-5'-P-CCNC: 24 U/L (ref 1–41)
ANION GAP SERPL CALCULATED.3IONS-SCNC: 11.1 MMOL/L (ref 5–15)
AST SERPL-CCNC: 25 U/L (ref 1–40)
BILIRUB SERPL-MCNC: 0.3 MG/DL (ref 0–1.2)
BUN SERPL-MCNC: 15 MG/DL (ref 8–23)
BUN/CREAT SERPL: 15.6 (ref 7–25)
CALCIUM SPEC-SCNC: 9.7 MG/DL (ref 8.6–10.5)
CHLORIDE SERPL-SCNC: 101 MMOL/L (ref 98–107)
CHOLEST SERPL-MCNC: 137 MG/DL (ref 0–200)
CK SERPL-CCNC: 79 U/L (ref 20–200)
CO2 SERPL-SCNC: 25.9 MMOL/L (ref 22–29)
CREAT SERPL-MCNC: 0.96 MG/DL (ref 0.76–1.27)
EGFRCR SERPLBLD CKD-EPI 2021: 82.9 ML/MIN/1.73
GLOBULIN UR ELPH-MCNC: 3.1 GM/DL
GLUCOSE SERPL-MCNC: 107 MG/DL (ref 65–99)
HBA1C MFR BLD: 6 % (ref 4.8–5.6)
HDLC SERPL-MCNC: 41 MG/DL (ref 40–60)
LDLC SERPL CALC-MCNC: 75 MG/DL (ref 0–100)
LDLC/HDLC SERPL: 1.78 {RATIO}
POTASSIUM SERPL-SCNC: 4.7 MMOL/L (ref 3.5–5.2)
PROT SERPL-MCNC: 7.3 G/DL (ref 6–8.5)
PSA SERPL-MCNC: 0.09 NG/ML (ref 0–4)
SODIUM SERPL-SCNC: 138 MMOL/L (ref 136–145)
TRIGL SERPL-MCNC: 116 MG/DL (ref 0–150)
VLDLC SERPL-MCNC: 21 MG/DL (ref 5–40)

## 2024-10-24 PROCEDURE — 80053 COMPREHEN METABOLIC PANEL: CPT

## 2024-10-24 PROCEDURE — 80061 LIPID PANEL: CPT

## 2024-10-24 PROCEDURE — 36415 COLL VENOUS BLD VENIPUNCTURE: CPT

## 2024-10-24 PROCEDURE — 83036 HEMOGLOBIN GLYCOSYLATED A1C: CPT

## 2024-10-24 PROCEDURE — 82550 ASSAY OF CK (CPK): CPT

## 2024-10-24 PROCEDURE — 84153 ASSAY OF PSA TOTAL: CPT

## 2024-10-28 NOTE — PROGRESS NOTES
Prostate cancer screening blood test is negative.  Blood sugar is mildly elevated.  You are not diabetic but are at  increased risk for developing diabetes in the future.  Cholesterol levels are at goal.  Kidney and liver function are normal and you do not have any sign of muscle breakdown.

## 2024-12-04 ENCOUNTER — OFFICE VISIT (OUTPATIENT)
Dept: FAMILY MEDICINE CLINIC | Age: 74
End: 2024-12-04
Payer: MEDICARE

## 2024-12-04 ENCOUNTER — TELEPHONE (OUTPATIENT)
Dept: FAMILY MEDICINE CLINIC | Age: 74
End: 2024-12-04
Payer: MEDICARE

## 2024-12-04 VITALS
SYSTOLIC BLOOD PRESSURE: 123 MMHG | OXYGEN SATURATION: 98 % | HEART RATE: 76 BPM | BODY MASS INDEX: 29.86 KG/M2 | DIASTOLIC BLOOD PRESSURE: 87 MMHG | WEIGHT: 197 LBS | TEMPERATURE: 98.1 F | HEIGHT: 68 IN

## 2024-12-04 DIAGNOSIS — R09.81 NASAL CONGESTION: ICD-10-CM

## 2024-12-04 DIAGNOSIS — R05.1 ACUTE COUGH: ICD-10-CM

## 2024-12-04 DIAGNOSIS — J34.89 RHINORRHEA: ICD-10-CM

## 2024-12-04 DIAGNOSIS — J34.89 SINUS PRESSURE: ICD-10-CM

## 2024-12-04 DIAGNOSIS — R09.89 CHEST CONGESTION: ICD-10-CM

## 2024-12-04 DIAGNOSIS — J06.9 UPPER RESPIRATORY TRACT INFECTION, UNSPECIFIED TYPE: Primary | ICD-10-CM

## 2024-12-04 LAB
EXPIRATION DATE: NORMAL
FLUAV AG UPPER RESP QL IA.RAPID: NOT DETECTED
FLUBV AG UPPER RESP QL IA.RAPID: NOT DETECTED
INTERNAL CONTROL: NORMAL
Lab: NORMAL
SARS-COV-2 AG UPPER RESP QL IA.RAPID: NOT DETECTED

## 2024-12-04 PROCEDURE — 3079F DIAST BP 80-89 MM HG: CPT | Performed by: STUDENT IN AN ORGANIZED HEALTH CARE EDUCATION/TRAINING PROGRAM

## 2024-12-04 PROCEDURE — 3074F SYST BP LT 130 MM HG: CPT | Performed by: STUDENT IN AN ORGANIZED HEALTH CARE EDUCATION/TRAINING PROGRAM

## 2024-12-04 PROCEDURE — 99214 OFFICE O/P EST MOD 30 MIN: CPT | Performed by: STUDENT IN AN ORGANIZED HEALTH CARE EDUCATION/TRAINING PROGRAM

## 2024-12-04 PROCEDURE — 87428 SARSCOV & INF VIR A&B AG IA: CPT | Performed by: STUDENT IN AN ORGANIZED HEALTH CARE EDUCATION/TRAINING PROGRAM

## 2024-12-04 RX ORDER — FLUTICASONE PROPIONATE 50 MCG
1 SPRAY, SUSPENSION (ML) NASAL 2 TIMES DAILY
Qty: 16 G | Refills: 0 | Status: SHIPPED | OUTPATIENT
Start: 2024-12-04

## 2024-12-04 RX ORDER — GUAIFENESIN 600 MG/1
1200 TABLET, EXTENDED RELEASE ORAL 2 TIMES DAILY
Qty: 40 TABLET | Refills: 0 | Status: SHIPPED | OUTPATIENT
Start: 2024-12-04

## 2024-12-04 NOTE — TELEPHONE ENCOUNTER
"Caller: Telly Meneses \"Jeyson\"    Relationship: Self    Best call back number: [phone]     What medication are you requesting: AMOXICILLIN    What are your current symptoms: CONGESTION, RUNNY NOSE    How long have you been experiencing symptoms: 3 DAYS    Have you had these symptoms before:    [] Yes  [x] No    Have you been treated for these symptoms before:   [] Yes  [x] No    If a prescription is needed, what is your preferred pharmacy and phone number: [place] [id number] [facility] [phone] [facility] [phone]      Additional notes:    PATIENT STATES HE IS AFRAID TO COME INTO THE OFFICE BECAUSE HE FEELS ROUGH. PATIENT STATES TO PLEASE CALL HIM BACK IF OR WHEN SOMETHING IS SENT IN.   "

## 2024-12-04 NOTE — PROGRESS NOTES
Chief Complaint     Cough (Productive cough X 3 days ), Nasal Congestion (Green nasal drainage X 3 days ), Headache (X 3 days ), Generalized Body Aches (X 3 days ), and Shortness of Breath (COPD, worsened since bring sick )    History of Present Illness     Telly Meneses is a 74 y.o. male who presents to [facility] with complaints of nasal congestion, sinus pressure, post nasal drip, rhinorrhea with green mucus, productive cough with clear sputum, fever, chills, body ache, soa. Symptoms started about a week ago.       Denies any chest pain, nausea, vomiting, diarrhea, dizziness.    Has not tried any over-the-counter medicines, he does take Claritin daily though.     History      Past Medical History:   Diagnosis Date    Anxiety     Heart murmur     Hyperlipidemia     Hypertension     Hypothyroidism     Prostate cancer        Past Surgical History:   Procedure Laterality Date    PROSTATE SURGERY      complete removal       Family History   Problem Relation Age of Onset    Alzheimer's disease Mother     Macular degeneration Mother     Cancer Sister     Diabetes Daughter         Current Medications        Current Outpatient Medications:     albuterol sulfate  (90 Base) MCG/ACT inhaler, Inhale 2 puffs Every 4 (Four) Hours As Needed for Wheezing., Disp: 18 g, Rfl: 0    allopurinol (ZYLOPRIM) 100 MG tablet, Take 1 tablet by mouth Daily., Disp: 90 tablet, Rfl: 1    aspirin 81 MG EC tablet, Take 1 tablet by mouth Daily., Disp: , Rfl:     atorvastatin (LIPITOR) 40 MG tablet, TAKE 1 TABLET BY MOUTH ONCE DAILY NIGHTLY, Disp: 90 tablet, Rfl: 3    baclofen (LIORESAL) 10 MG tablet, Take 1 tablet by mouth 2 (Two) Times a Day As Needed for Muscle Spasms. May cause drowsiness, Disp: 30 tablet, Rfl: 1    fluticasone (Flonase) 50 MCG/ACT nasal spray, 2 sprays into the nostril(s) as directed by provider Daily., Disp: 11.1 mL, Rfl: 0    hydrOXYzine (ATARAX) 25 MG tablet, Take 2 tablets by  "mouth At Night As Needed (insomnia). for anxiety, Disp: 180 tablet, Rfl: 1    lisinopril-hydrochlorothiazide (PRINZIDE,ZESTORETIC) 20-12.5 MG per tablet, Take 1 tablet by mouth Daily., Disp: 90 tablet, Rfl: 1    nitroglycerin (NITROSTAT) 0.4 MG SL tablet, Place 1 tablet under the tongue Every 5 (Five) Minutes As Needed for Chest Pain. Take no more than 3 doses in 15 minutes., Disp: 100 tablet, Rfl: 0    sertraline (Zoloft) 50 MG tablet, Take  one and one half  tablet once daily, Disp: 135 tablet, Rfl: 1     Allergies     Allergies   Allergen Reactions    Venlafaxine GI Intolerance       Social History       Social History     Social History Narrative    Not on file       Immunizations     Immunization:  Immunization History   Administered Date(s) Administered    COVID-19 (MODERNA) 1st,2nd,3rd Dose Monovalent 03/17/2021, 04/14/2021, 11/22/2021    Flublok 18+yrs 10/21/2019    Fluzone High-Dose 65+YRS 11/19/2016    Fluzone High-Dose 65+yrs 11/29/2022, 10/10/2023    Pneumococcal Conjugate 13-Valent (PCV13) 11/19/2016    Pneumococcal Conjugate 20-Valent (PCV20) 10/10/2023    Pneumococcal Polysaccharide (PPSV23) 10/21/2019    Tdap 02/25/2016          Objective     Objective     Vital Signs:   /87 (BP Location: Right arm, Patient Position: Sitting, Cuff Size: Adult)   Pulse 76   Temp 98.1 °F (36.7 °C) (Oral)   Ht 172.7 cm (67.99\")   Wt 89.4 kg (197 lb)   SpO2 98%   BMI 29.96 kg/m²       Physical Exam  Vitals and nursing note reviewed.   Constitutional:       Appearance: Normal appearance. He is overweight.   HENT:      Head: Normocephalic.      Right Ear: Tympanic membrane, ear canal and external ear normal.      Left Ear: Tympanic membrane, ear canal and external ear normal.      Mouth/Throat:      Mouth: Mucous membranes are moist.      Pharynx: Oropharynx is clear. Uvula midline.   Eyes:      Conjunctiva/sclera: Conjunctivae normal.      Pupils: Pupils are equal, round, and reactive to light. "   Cardiovascular:      Rate and Rhythm: Normal rate and regular rhythm.      Pulses: Normal pulses.      Heart sounds: Normal heart sounds.   Pulmonary:      Effort: Pulmonary effort is normal.      Breath sounds: Decreased air movement present.      Comments: Decreased breath sounds in the bases  Abdominal:      General: Bowel sounds are normal.      Palpations: Abdomen is soft.   Musculoskeletal:         General: Normal range of motion.      Cervical back: Normal range of motion and neck supple.   Skin:     General: Skin is warm and dry.   Neurological:      General: No focal deficit present.      Mental Status: He is alert and oriented to person, place, and time.   Psychiatric:         Attention and Perception: Attention normal.         Mood and Affect: Mood and affect normal.         Behavior: Behavior normal. Behavior is cooperative.         Results    The following data was reviewed by: CHRISSY Hudson on 12/04/24                POCT SARS-CoV-2 Antigen ABRAHAN + Flu (12/04/2024 12:08)   Assessment and Plan        Assessment and Plan       Upper respiratory tract infection, unspecified type    Orders:    amoxicillin-clavulanate (AUGMENTIN) 875-125 MG per tablet; Take 1 tablet by mouth 2 (Two) Times a Day.    Acute cough    Orders:    POCT SARS-CoV-2 Antigen ABRAHAN + Flu    guaiFENesin (Mucinex) 600 MG 12 hr tablet; Take 2 tablets by mouth 2 (Two) Times a Day.    Nasal congestion    Orders:    POCT SARS-CoV-2 Antigen ABRAHAN + Flu    fluticasone (FLONASE) 50 MCG/ACT nasal spray; Administer 1 spray into the nostril(s) as directed by provider 2 (Two) Times a Day.    Sinus pressure    Orders:    fluticasone (FLONASE) 50 MCG/ACT nasal spray; Administer 1 spray into the nostril(s) as directed by provider 2 (Two) Times a Day.    Rhinorrhea    Orders:    POCT SARS-CoV-2 Antigen ABRAHAN + Flu    fluticasone (FLONASE) 50 MCG/ACT nasal spray; Administer 1 spray into the nostril(s) as directed by provider 2 (Two) Times a  Day.    Chest congestion    Orders:    guaiFENesin (Mucinex) 600 MG 12 hr tablet; Take 2 tablets by mouth 2 (Two) Times a Day.       Educated on drinking water and rest.  Educated on hospital precautions      Follow Up        Follow Up   No follow-ups on file.  Patient was given instructions and counseling regarding his condition or for health maintenance advice. Please see specific information pulled into the AVS if appropriate.

## 2025-01-13 ENCOUNTER — CLINICAL SUPPORT (OUTPATIENT)
Dept: FAMILY MEDICINE CLINIC | Age: 75
End: 2025-01-13
Payer: MEDICARE

## 2025-01-13 DIAGNOSIS — Z23 NEED FOR IMMUNIZATION AGAINST INFLUENZA: Primary | ICD-10-CM

## 2025-01-13 PROCEDURE — 90662 IIV NO PRSV INCREASED AG IM: CPT | Performed by: FAMILY MEDICINE

## 2025-01-13 PROCEDURE — G0008 ADMIN INFLUENZA VIRUS VAC: HCPCS | Performed by: FAMILY MEDICINE

## 2025-02-27 ENCOUNTER — OFFICE VISIT (OUTPATIENT)
Dept: FAMILY MEDICINE CLINIC | Age: 75
End: 2025-02-27
Payer: MEDICARE

## 2025-02-27 ENCOUNTER — LAB (OUTPATIENT)
Dept: LAB | Facility: HOSPITAL | Age: 75
End: 2025-02-27
Payer: MEDICARE

## 2025-02-27 VITALS
OXYGEN SATURATION: 100 % | SYSTOLIC BLOOD PRESSURE: 129 MMHG | DIASTOLIC BLOOD PRESSURE: 68 MMHG | TEMPERATURE: 96.4 F | BODY MASS INDEX: 30.92 KG/M2 | WEIGHT: 204 LBS | HEART RATE: 64 BPM | HEIGHT: 68 IN

## 2025-02-27 DIAGNOSIS — R41.3 MEMORY CHANGE: Primary | ICD-10-CM

## 2025-02-27 DIAGNOSIS — Z86.69 HISTORY OF SLEEP APNEA: ICD-10-CM

## 2025-02-27 DIAGNOSIS — R25.1 TREMOR OF RIGHT HAND: ICD-10-CM

## 2025-02-27 DIAGNOSIS — R41.3 MEMORY CHANGE: ICD-10-CM

## 2025-02-27 LAB
BASOPHILS # BLD AUTO: 0.04 10*3/MM3 (ref 0–0.2)
BASOPHILS NFR BLD AUTO: 0.4 % (ref 0–1.5)
BILIRUB BLD-MCNC: NEGATIVE MG/DL
CLARITY, POC: CLEAR
COLOR UR: YELLOW
DEPRECATED RDW RBC AUTO: 48.7 FL (ref 37–54)
EOSINOPHIL # BLD AUTO: 0.09 10*3/MM3 (ref 0–0.4)
EOSINOPHIL NFR BLD AUTO: 0.9 % (ref 0.3–6.2)
ERYTHROCYTE [DISTWIDTH] IN BLOOD BY AUTOMATED COUNT: 14.2 % (ref 12.3–15.4)
EXPIRATION DATE: NORMAL
GLUCOSE UR STRIP-MCNC: NEGATIVE MG/DL
HCT VFR BLD AUTO: 52.3 % (ref 37.5–51)
HGB BLD-MCNC: 16.9 G/DL (ref 13–17.7)
IMM GRANULOCYTES # BLD AUTO: 0.07 10*3/MM3 (ref 0–0.05)
IMM GRANULOCYTES NFR BLD AUTO: 0.7 % (ref 0–0.5)
KETONES UR QL: NEGATIVE
LEUKOCYTE EST, POC: NEGATIVE
LYMPHOCYTES # BLD AUTO: 2.75 10*3/MM3 (ref 0.7–3.1)
LYMPHOCYTES NFR BLD AUTO: 26.8 % (ref 19.6–45.3)
Lab: NORMAL
MCH RBC QN AUTO: 29.5 PG (ref 26.6–33)
MCHC RBC AUTO-ENTMCNC: 32.3 G/DL (ref 31.5–35.7)
MCV RBC AUTO: 91.3 FL (ref 79–97)
MONOCYTES # BLD AUTO: 0.84 10*3/MM3 (ref 0.1–0.9)
MONOCYTES NFR BLD AUTO: 8.2 % (ref 5–12)
NEUTROPHILS NFR BLD AUTO: 6.47 10*3/MM3 (ref 1.7–7)
NEUTROPHILS NFR BLD AUTO: 63 % (ref 42.7–76)
NITRITE UR-MCNC: NEGATIVE MG/ML
PH UR: 6 [PH] (ref 5–8)
PLATELET # BLD AUTO: 193 10*3/MM3 (ref 140–450)
PMV BLD AUTO: 9.9 FL (ref 6–12)
PROT UR STRIP-MCNC: NEGATIVE MG/DL
RBC # BLD AUTO: 5.73 10*6/MM3 (ref 4.14–5.8)
RBC # UR STRIP: NEGATIVE /UL
SP GR UR: 1.02 (ref 1–1.03)
TSH SERPL DL<=0.05 MIU/L-ACNC: 2.12 UIU/ML (ref 0.27–4.2)
UROBILINOGEN UR QL: NORMAL
VIT B12 BLD-MCNC: 317 PG/ML (ref 211–946)
WBC NRBC COR # BLD AUTO: 10.26 10*3/MM3 (ref 3.4–10.8)

## 2025-02-27 PROCEDURE — 1160F RVW MEDS BY RX/DR IN RCRD: CPT | Performed by: NURSE PRACTITIONER

## 2025-02-27 PROCEDURE — 3074F SYST BP LT 130 MM HG: CPT | Performed by: NURSE PRACTITIONER

## 2025-02-27 PROCEDURE — 3078F DIAST BP <80 MM HG: CPT | Performed by: NURSE PRACTITIONER

## 2025-02-27 PROCEDURE — 99214 OFFICE O/P EST MOD 30 MIN: CPT | Performed by: NURSE PRACTITIONER

## 2025-02-27 PROCEDURE — 85025 COMPLETE CBC W/AUTO DIFF WBC: CPT

## 2025-02-27 PROCEDURE — 81003 URINALYSIS AUTO W/O SCOPE: CPT | Performed by: NURSE PRACTITIONER

## 2025-02-27 PROCEDURE — 84443 ASSAY THYROID STIM HORMONE: CPT

## 2025-02-27 PROCEDURE — 36415 COLL VENOUS BLD VENIPUNCTURE: CPT

## 2025-02-27 PROCEDURE — 82607 VITAMIN B-12: CPT

## 2025-02-27 PROCEDURE — 1159F MED LIST DOCD IN RCRD: CPT | Performed by: NURSE PRACTITIONER

## 2025-02-27 NOTE — PROGRESS NOTES
"Chief Complaint  Memory Loss and Shaking (Hands )    Subjective          Telly Meneses presents to [facility]     Patient is a 74-year-old male who is here today with his daughter Teressa.  Concerned about essential tremor primarily affecting the right hand and some difficulty with directions 1 day recently.  History of sleep apnea and his CPAP machine stopped working.  Has not had a sleep study done in a long while.     Objective   Vital Signs:   Vitals:    02/27/25 1008   BP: 129/68   BP Location: Left arm   Patient Position: Sitting   Cuff Size: Adult   Pulse: 64   Temp: 96.4 °F (35.8 °C)   TempSrc: Temporal   SpO2: 100%   Weight: 92.5 kg (204 lb)   Height: 172.7 cm (67.99\")       Wt Readings from Last 3 Encounters:   02/27/25 92.5 kg (204 lb)   12/04/24 89.4 kg (197 lb)   09/26/24 88.5 kg (195 lb)      BP Readings from Last 3 Encounters:   02/27/25 129/68   12/04/24 123/87   09/24/24 111/68       Body mass index is 31.03 kg/m².             Physical Exam  Vitals reviewed.   Constitutional:       General: He is not in acute distress.     Appearance: Normal appearance. He is well-developed.   Cardiovascular:      Rate and Rhythm: Normal rate and regular rhythm.      Heart sounds: Normal heart sounds.   Pulmonary:      Effort: Pulmonary effort is normal.      Breath sounds: Normal breath sounds.   Musculoskeletal:      Right lower leg: No edema.      Left lower leg: No edema.   Skin:     General: Skin is warm and dry.   Neurological:      General: No focal deficit present.      Mental Status: He is alert.   Psychiatric:         Attention and Perception: Attention normal.         Mood and Affect: Mood and affect normal.         Behavior: Behavior normal.           Current Outpatient Medications:     albuterol sulfate  (90 Base) MCG/ACT inhaler, Inhale 2 puffs Every 4 (Four) Hours As Needed for Wheezing., Disp: 18 g, Rfl: 0    allopurinol (ZYLOPRIM) 100 MG tablet, Take 1 " tablet by mouth Daily., Disp: 90 tablet, Rfl: 1    aspirin 81 MG EC tablet, Take 1 tablet by mouth Daily., Disp: , Rfl:     atorvastatin (LIPITOR) 40 MG tablet, TAKE 1 TABLET BY MOUTH ONCE DAILY NIGHTLY, Disp: 90 tablet, Rfl: 3    baclofen (LIORESAL) 10 MG tablet, Take 1 tablet by mouth 2 (Two) Times a Day As Needed for Muscle Spasms. May cause drowsiness, Disp: 30 tablet, Rfl: 1    fluticasone (FLONASE) 50 MCG/ACT nasal spray, Administer 1 spray into the nostril(s) as directed by provider 2 (Two) Times a Day., Disp: 16 g, Rfl: 0    hydrOXYzine (ATARAX) 25 MG tablet, Take 2 tablets by mouth At Night As Needed (insomnia). for anxiety, Disp: 180 tablet, Rfl: 1    lisinopril-hydrochlorothiazide (PRINZIDE,ZESTORETIC) 20-12.5 MG per tablet, Take 1 tablet by mouth Daily., Disp: 90 tablet, Rfl: 1    nitroglycerin (NITROSTAT) 0.4 MG SL tablet, Place 1 tablet under the tongue Every 5 (Five) Minutes As Needed for Chest Pain. Take no more than 3 doses in 15 minutes., Disp: 100 tablet, Rfl: 0    sertraline (Zoloft) 50 MG tablet, Take  one and one half  tablet once daily (Patient taking differently: Take 1 tablet by mouth Daily. Take  one and one half  tablet once daily), Disp: 135 tablet, Rfl: 1   Past Medical History:   Diagnosis Date    Anxiety     Heart murmur     Hyperlipidemia     Hypertension     Hypothyroidism     Prostate cancer      Allergies   Allergen Reactions    Venlafaxine GI Intolerance               Result Review :     Common labs          3/27/2024    08:53 10/24/2024    10:07 2/27/2025    11:20   Common Labs   Glucose 97  107     BUN 18  15     Creatinine 1.17  0.96     Sodium 140  138     Potassium 4.6  4.7     Chloride 103  101     Calcium 9.3  9.7     Albumin  4.2     Total Bilirubin  0.3     Alkaline Phosphatase  85     AST (SGOT)  25     ALT (SGPT)  24     WBC   10.26    Hemoglobin   16.9    Hematocrit   52.3    Platelets   193    Total Cholesterol  137     Triglycerides  116     HDL Cholesterol  41      LDL Cholesterol   75     Hemoglobin A1C 5.80  6.00     PSA  0.092     Uric Acid 4.5           No Images in the past 120 days found..           Social History     Tobacco Use   Smoking Status Every Day    Current packs/day: 0.50    Average packs/day: 0.5 packs/day for 57.2 years (28.6 ttl pk-yrs)    Types: Cigarettes    Start date: 1968   Smokeless Tobacco Former    Types: Chew    Quit date: 1985   Tobacco Comments    Used to smoke 1 ppd            Assessment and Plan    Diagnoses and all orders for this visit:    1. Memory change (Primary)  Assessment & Plan:  MoCA test score 11 out of 30.  Urinalysis is negative for infection.  Further lab results are pending.  Acknowledges that sleep issues, anxiety, or depression could also be contributors to memory change.  There is often a wait to get into see a neurologist and due to concern with tremor and memory change will go ahead and refer to neurology for further evaluation.    Orders:  -     POC Urinalysis Dipstick, Automated  -     TSH Rfx On Abnormal To Free T4; Future  -     Vitamin B12; Future  -     CBC w AUTO Differential; Future  -     Ambulatory Referral to Neurology    2. Tremor of right hand  -     Ambulatory Referral to Neurology    3. History of sleep apnea  -     Ambulatory Referral to Sleep Medicine        Follow Up    No follow-ups on file.  Patient was given instructions and counseling regarding his condition or for health maintenance advice. Please see specific information pulled into the AVS if appropriate.

## 2025-02-27 NOTE — ASSESSMENT & PLAN NOTE
MoCA test score 11 out of 30.  Urinalysis is negative for infection.  Further lab results are pending.  Acknowledges that sleep issues, anxiety, or depression could also be contributors to memory change.  There is often a wait to get into see a neurologist and due to concern with tremor and memory change will go ahead and refer to neurology for further evaluation.

## 2025-03-03 NOTE — PROGRESS NOTES
Normal thyroid and vitamin b12 level.  You are not anemic.  Hematocrit is elevated but reassuring that hemoglobin, white blood cells count, red blood count and  platelets are normal.   Could be due to smoking or dehydration.  We will recheck a cbc again soon.  Will discuss further at march 18 visit.

## 2025-03-18 ENCOUNTER — OFFICE VISIT (OUTPATIENT)
Dept: FAMILY MEDICINE CLINIC | Age: 75
End: 2025-03-18
Payer: MEDICARE

## 2025-03-18 VITALS
OXYGEN SATURATION: 97 % | HEART RATE: 67 BPM | TEMPERATURE: 97.1 F | HEIGHT: 68 IN | DIASTOLIC BLOOD PRESSURE: 71 MMHG | BODY MASS INDEX: 30.55 KG/M2 | WEIGHT: 201.6 LBS | SYSTOLIC BLOOD PRESSURE: 130 MMHG

## 2025-03-18 DIAGNOSIS — I10 PRIMARY HYPERTENSION: ICD-10-CM

## 2025-03-18 DIAGNOSIS — M1A.9XX0 CHRONIC GOUT WITHOUT TOPHUS, UNSPECIFIED CAUSE, UNSPECIFIED SITE: ICD-10-CM

## 2025-03-18 DIAGNOSIS — F41.9 ANXIETY: ICD-10-CM

## 2025-03-18 DIAGNOSIS — G47.00 INSOMNIA, UNSPECIFIED TYPE: ICD-10-CM

## 2025-03-18 PROCEDURE — 1160F RVW MEDS BY RX/DR IN RCRD: CPT | Performed by: NURSE PRACTITIONER

## 2025-03-18 PROCEDURE — 3075F SYST BP GE 130 - 139MM HG: CPT | Performed by: NURSE PRACTITIONER

## 2025-03-18 PROCEDURE — 1159F MED LIST DOCD IN RCRD: CPT | Performed by: NURSE PRACTITIONER

## 2025-03-18 PROCEDURE — 3078F DIAST BP <80 MM HG: CPT | Performed by: NURSE PRACTITIONER

## 2025-03-18 PROCEDURE — 99213 OFFICE O/P EST LOW 20 MIN: CPT | Performed by: NURSE PRACTITIONER

## 2025-03-18 NOTE — PROGRESS NOTES
"Chief Complaint  Memory Change    Subjective          Telly Meneses presents to [facility]     Patient is a 74-year-old male who is here today with his daughter.  He will see neurology May 6 for further evaluation of memory concerns.  Patient currently is deferring sleep study.  He denies concerns today.  He reports his blood pressure is under good control on lisinopril 20 mg with hydrochlorothiazide 12.5 mg daily.  Denies side effects and requests refills.    No episodes of gout and remains on allopurinol 100 mg/day.  Denies side effects and requests refills.    Continues to take sertraline 50 mg daily.  It was most recently prescribed for 1-1/2 tablets daily.  Patient will consider if he wants to take that higher dosage.  Refills are requested.     Objective   Vital Signs:   Vitals:    03/18/25 1427   BP: 130/71   BP Location: Right arm   Patient Position: Sitting   Cuff Size: Adult   Pulse: 67   Temp: 97.1 °F (36.2 °C)   TempSrc: Temporal   SpO2: 97%   Weight: 91.4 kg (201 lb 9.6 oz)   Height: 172.7 cm (67.99\")       Wt Readings from Last 3 Encounters:   03/18/25 91.4 kg (201 lb 9.6 oz)   02/27/25 92.5 kg (204 lb)   12/04/24 89.4 kg (197 lb)      BP Readings from Last 3 Encounters:   03/18/25 130/71   02/27/25 129/68   12/04/24 123/87       Body mass index is 30.66 kg/m².             Physical Exam  Vitals reviewed.   Constitutional:       General: He is not in acute distress.     Appearance: Normal appearance. He is well-developed.   Neck:      Thyroid: No thyromegaly.      Vascular: No carotid bruit.   Cardiovascular:      Rate and Rhythm: Normal rate and regular rhythm.      Heart sounds: Normal heart sounds.   Pulmonary:      Effort: Pulmonary effort is normal.      Breath sounds: Normal breath sounds.   Musculoskeletal:      Right lower leg: No edema.      Left lower leg: No edema.   Skin:     General: Skin is warm and dry.   Neurological:      General: No focal deficit " present.      Mental Status: He is alert.   Psychiatric:         Attention and Perception: Attention normal.         Mood and Affect: Mood and affect normal.         Behavior: Behavior normal.           Current Outpatient Medications:     albuterol sulfate  (90 Base) MCG/ACT inhaler, Inhale 2 puffs Every 4 (Four) Hours As Needed for Wheezing., Disp: 18 g, Rfl: 0    allopurinol (ZYLOPRIM) 100 MG tablet, Take 1 tablet by mouth Daily., Disp: 90 tablet, Rfl: 1    aspirin 81 MG EC tablet, Take 1 tablet by mouth Daily., Disp: , Rfl:     atorvastatin (LIPITOR) 40 MG tablet, TAKE 1 TABLET BY MOUTH ONCE DAILY NIGHTLY, Disp: 90 tablet, Rfl: 3    baclofen (LIORESAL) 10 MG tablet, Take 1 tablet by mouth 2 (Two) Times a Day As Needed for Muscle Spasms. May cause drowsiness, Disp: 30 tablet, Rfl: 1    fluticasone (FLONASE) 50 MCG/ACT nasal spray, Administer 1 spray into the nostril(s) as directed by provider 2 (Two) Times a Day., Disp: 16 g, Rfl: 0    hydrOXYzine (ATARAX) 25 MG tablet, Take 2 tablets by mouth At Night As Needed (insomnia). for anxiety, Disp: 180 tablet, Rfl: 1    lisinopril-hydrochlorothiazide (PRINZIDE,ZESTORETIC) 20-12.5 MG per tablet, Take 1 tablet by mouth Daily., Disp: 90 tablet, Rfl: 1    nitroglycerin (NITROSTAT) 0.4 MG SL tablet, Place 1 tablet under the tongue Every 5 (Five) Minutes As Needed for Chest Pain. Take no more than 3 doses in 15 minutes., Disp: 100 tablet, Rfl: 0    sertraline (Zoloft) 50 MG tablet, Take  one and one half  tablet once daily, Disp: 135 tablet, Rfl: 1   Past Medical History:   Diagnosis Date    Anxiety     Heart murmur     Hyperlipidemia     Hypertension     Hypothyroidism     Prostate cancer      Allergies   Allergen Reactions    Venlafaxine GI Intolerance               Result Review :     Common labs          3/27/2024    08:53 10/24/2024    10:07 2/27/2025    11:20   Common Labs   Glucose 97  107     BUN 18  15     Creatinine 1.17  0.96     Sodium 140  138      Potassium 4.6  4.7     Chloride 103  101     Calcium 9.3  9.7     Albumin  4.2     Total Bilirubin  0.3     Alkaline Phosphatase  85     AST (SGOT)  25     ALT (SGPT)  24     WBC   10.26    Hemoglobin   16.9    Hematocrit   52.3    Platelets   193    Total Cholesterol  137     Triglycerides  116     HDL Cholesterol  41     LDL Cholesterol   75     Hemoglobin A1C 5.80  6.00     PSA  0.092     Uric Acid 4.5           No Images in the past 120 days found..           Social History     Tobacco Use   Smoking Status Every Day    Current packs/day: 0.50    Average packs/day: 0.5 packs/day for 57.2 years (28.6 ttl pk-yrs)    Types: Cigarettes    Start date: 1968   Smokeless Tobacco Former    Types: Chew    Quit date: 1985   Tobacco Comments    Used to smoke 1 ppd            Assessment and Plan    Diagnoses and all orders for this visit:    1. Primary hypertension  -     lisinopril-hydrochlorothiazide (PRINZIDE,ZESTORETIC) 20-12.5 MG per tablet; Take 1 tablet by mouth Daily.  Dispense: 90 tablet; Refill: 1    2. Chronic gout without tophus, unspecified cause, unspecified site  -     allopurinol (ZYLOPRIM) 100 MG tablet; Take 1 tablet by mouth Daily.  Dispense: 90 tablet; Refill: 1    3. Anxiety  -     sertraline (Zoloft) 50 MG tablet; Take  one and one half  tablet once daily  Dispense: 135 tablet; Refill: 1  -     hydrOXYzine (ATARAX) 25 MG tablet; Take 2 tablets by mouth At Night As Needed (insomnia). for anxiety  Dispense: 180 tablet; Refill: 1    4. Insomnia, unspecified type  -     hydrOXYzine (ATARAX) 25 MG tablet; Take 2 tablets by mouth At Night As Needed (insomnia). for anxiety  Dispense: 180 tablet; Refill: 1        Follow Up    Return in about 6 months (around 9/18/2025).  Patient was given instructions and counseling regarding his condition or for health maintenance advice. Please see specific information pulled into the AVS if appropriate.

## 2025-03-19 RX ORDER — HYDROXYZINE HYDROCHLORIDE 25 MG/1
50 TABLET, FILM COATED ORAL NIGHTLY PRN
Qty: 180 TABLET | Refills: 1 | Status: SHIPPED | OUTPATIENT
Start: 2025-03-19

## 2025-03-19 RX ORDER — ALLOPURINOL 100 MG/1
100 TABLET ORAL DAILY
Qty: 90 TABLET | Refills: 1 | Status: SHIPPED | OUTPATIENT
Start: 2025-03-19

## 2025-03-19 RX ORDER — LISINOPRIL AND HYDROCHLOROTHIAZIDE 12.5; 2 MG/1; MG/1
1 TABLET ORAL DAILY
Qty: 90 TABLET | Refills: 1 | Status: SHIPPED | OUTPATIENT
Start: 2025-03-19

## 2025-06-28 DIAGNOSIS — F41.9 ANXIETY: ICD-10-CM

## 2025-08-27 ENCOUNTER — TELEPHONE (OUTPATIENT)
Dept: CARDIOLOGY | Facility: CLINIC | Age: 75
End: 2025-08-27
Payer: MEDICARE

## 2025-08-27 ENCOUNTER — OFFICE VISIT (OUTPATIENT)
Dept: FAMILY MEDICINE CLINIC | Age: 75
End: 2025-08-27
Payer: MEDICARE

## 2025-08-27 VITALS
TEMPERATURE: 98 F | WEIGHT: 199 LBS | SYSTOLIC BLOOD PRESSURE: 120 MMHG | HEART RATE: 73 BPM | BODY MASS INDEX: 30.16 KG/M2 | OXYGEN SATURATION: 95 % | HEIGHT: 68 IN | DIASTOLIC BLOOD PRESSURE: 69 MMHG

## 2025-08-27 DIAGNOSIS — R73.03 PREDIABETES: Primary | ICD-10-CM

## 2025-08-27 DIAGNOSIS — G47.00 INSOMNIA, UNSPECIFIED TYPE: ICD-10-CM

## 2025-08-27 DIAGNOSIS — R19.5 DARK STOOLS: ICD-10-CM

## 2025-08-27 DIAGNOSIS — M1A.9XX0 CHRONIC GOUT WITHOUT TOPHUS, UNSPECIFIED CAUSE, UNSPECIFIED SITE: ICD-10-CM

## 2025-08-27 DIAGNOSIS — F41.9 ANXIETY: ICD-10-CM

## 2025-08-27 DIAGNOSIS — I10 PRIMARY HYPERTENSION: ICD-10-CM

## 2025-08-27 DIAGNOSIS — E78.2 MIXED HYPERLIPIDEMIA: ICD-10-CM

## 2025-08-27 RX ORDER — HYDROXYZINE HYDROCHLORIDE 25 MG/1
50 TABLET, FILM COATED ORAL NIGHTLY PRN
Qty: 180 TABLET | Refills: 1 | Status: SHIPPED | OUTPATIENT
Start: 2025-08-27

## 2025-08-27 RX ORDER — ATORVASTATIN CALCIUM 40 MG/1
40 TABLET, FILM COATED ORAL
Qty: 90 TABLET | Refills: 0 | Status: SHIPPED | OUTPATIENT
Start: 2025-08-27

## 2025-08-27 RX ORDER — DONEPEZIL HYDROCHLORIDE 5 MG/1
5 TABLET, FILM COATED ORAL DAILY
COMMUNITY
Start: 2025-06-30

## 2025-08-27 RX ORDER — ALLOPURINOL 100 MG/1
100 TABLET ORAL DAILY
Qty: 90 TABLET | Refills: 1 | Status: SHIPPED | OUTPATIENT
Start: 2025-08-27

## 2025-08-27 RX ORDER — LISINOPRIL AND HYDROCHLOROTHIAZIDE 12.5; 2 MG/1; MG/1
1 TABLET ORAL DAILY
Qty: 90 TABLET | Refills: 1 | Status: SHIPPED | OUTPATIENT
Start: 2025-08-27

## 2025-08-28 ENCOUNTER — RESULTS FOLLOW-UP (OUTPATIENT)
Dept: LAB | Facility: HOSPITAL | Age: 75
End: 2025-08-28
Payer: MEDICARE

## 2025-08-28 ENCOUNTER — LAB (OUTPATIENT)
Dept: LAB | Facility: HOSPITAL | Age: 75
End: 2025-08-28
Payer: MEDICARE

## 2025-08-28 DIAGNOSIS — R73.03 PREDIABETES: ICD-10-CM

## 2025-08-28 DIAGNOSIS — R19.5 DARK STOOLS: ICD-10-CM

## 2025-08-28 DIAGNOSIS — E78.2 MIXED HYPERLIPIDEMIA: ICD-10-CM

## 2025-08-28 DIAGNOSIS — I10 PRIMARY HYPERTENSION: ICD-10-CM

## 2025-08-28 LAB
ALBUMIN SERPL-MCNC: 4.3 G/DL (ref 3.5–5.2)
ALBUMIN/GLOB SERPL: 1.4 G/DL
ALP SERPL-CCNC: 89 U/L (ref 39–117)
ALT SERPL W P-5'-P-CCNC: 25 U/L (ref 1–41)
ANION GAP SERPL CALCULATED.3IONS-SCNC: 12.4 MMOL/L (ref 5–15)
AST SERPL-CCNC: 27 U/L (ref 1–40)
BASOPHILS # BLD AUTO: 0.06 10*3/MM3 (ref 0–0.2)
BASOPHILS NFR BLD AUTO: 0.7 % (ref 0–1.5)
BILIRUB SERPL-MCNC: 0.3 MG/DL (ref 0–1.2)
BUN SERPL-MCNC: 14 MG/DL (ref 8–23)
BUN/CREAT SERPL: 13.1 (ref 7–25)
CALCIUM SPEC-SCNC: 9.8 MG/DL (ref 8.6–10.5)
CHLORIDE SERPL-SCNC: 101 MMOL/L (ref 98–107)
CHOLEST SERPL-MCNC: 117 MG/DL (ref 0–200)
CO2 SERPL-SCNC: 25.6 MMOL/L (ref 22–29)
CREAT SERPL-MCNC: 1.07 MG/DL (ref 0.76–1.27)
DEPRECATED RDW RBC AUTO: 47.5 FL (ref 37–54)
EGFRCR SERPLBLD CKD-EPI 2021: 72.4 ML/MIN/1.73
EOSINOPHIL # BLD AUTO: 0.33 10*3/MM3 (ref 0–0.4)
EOSINOPHIL NFR BLD AUTO: 3.9 % (ref 0.3–6.2)
ERYTHROCYTE [DISTWIDTH] IN BLOOD BY AUTOMATED COUNT: 13.3 % (ref 12.3–15.4)
GLOBULIN UR ELPH-MCNC: 3.1 GM/DL
GLUCOSE SERPL-MCNC: 106 MG/DL (ref 65–99)
HBA1C MFR BLD: 6.2 % (ref 4.8–5.6)
HCT VFR BLD AUTO: 39.8 % (ref 37.5–51)
HDLC SERPL-MCNC: 32 MG/DL (ref 40–60)
HGB BLD-MCNC: 13.5 G/DL (ref 13–17.7)
IMM GRANULOCYTES # BLD AUTO: 0.01 10*3/MM3 (ref 0–0.05)
IMM GRANULOCYTES NFR BLD AUTO: 0.1 % (ref 0–0.5)
LDLC SERPL CALC-MCNC: 63 MG/DL (ref 0–100)
LDLC/HDLC SERPL: 1.91 {RATIO}
LYMPHOCYTES # BLD AUTO: 3.61 10*3/MM3 (ref 0.7–3.1)
LYMPHOCYTES NFR BLD AUTO: 43.1 % (ref 19.6–45.3)
MCH RBC QN AUTO: 32.5 PG (ref 26.6–33)
MCHC RBC AUTO-ENTMCNC: 33.9 G/DL (ref 31.5–35.7)
MCV RBC AUTO: 95.7 FL (ref 79–97)
MONOCYTES # BLD AUTO: 0.75 10*3/MM3 (ref 0.1–0.9)
MONOCYTES NFR BLD AUTO: 8.9 % (ref 5–12)
NEUTROPHILS NFR BLD AUTO: 3.62 10*3/MM3 (ref 1.7–7)
NEUTROPHILS NFR BLD AUTO: 43.3 % (ref 42.7–76)
PLATELET # BLD AUTO: 364 10*3/MM3 (ref 140–450)
PMV BLD AUTO: 10.2 FL (ref 6–12)
POTASSIUM SERPL-SCNC: 4.3 MMOL/L (ref 3.5–5.2)
PROT SERPL-MCNC: 7.4 G/DL (ref 6–8.5)
RBC # BLD AUTO: 4.16 10*6/MM3 (ref 4.14–5.8)
SODIUM SERPL-SCNC: 139 MMOL/L (ref 136–145)
TRIGL SERPL-MCNC: 119 MG/DL (ref 0–150)
VLDLC SERPL-MCNC: 22 MG/DL (ref 5–40)
WBC NRBC COR # BLD AUTO: 8.38 10*3/MM3 (ref 3.4–10.8)

## 2025-08-28 PROCEDURE — 83036 HEMOGLOBIN GLYCOSYLATED A1C: CPT

## 2025-08-28 PROCEDURE — 80061 LIPID PANEL: CPT

## 2025-08-28 PROCEDURE — 85025 COMPLETE CBC W/AUTO DIFF WBC: CPT

## 2025-08-28 PROCEDURE — 36415 COLL VENOUS BLD VENIPUNCTURE: CPT

## 2025-08-28 PROCEDURE — 80053 COMPREHEN METABOLIC PANEL: CPT
